# Patient Record
Sex: FEMALE | Race: WHITE | Employment: FULL TIME | URBAN - METROPOLITAN AREA
[De-identification: names, ages, dates, MRNs, and addresses within clinical notes are randomized per-mention and may not be internally consistent; named-entity substitution may affect disease eponyms.]

---

## 2017-08-07 ENCOUNTER — OFFICE VISIT (OUTPATIENT)
Dept: OBGYN | Age: 52
End: 2017-08-07

## 2017-08-07 VITALS
WEIGHT: 169 LBS | BODY MASS INDEX: 28.85 KG/M2 | SYSTOLIC BLOOD PRESSURE: 128 MMHG | DIASTOLIC BLOOD PRESSURE: 80 MMHG | HEIGHT: 64 IN

## 2017-08-07 DIAGNOSIS — Z12.31 SCREENING MAMMOGRAM, ENCOUNTER FOR: ICD-10-CM

## 2017-08-07 DIAGNOSIS — Z11.51 SPECIAL SCREENING EXAMINATION FOR HUMAN PAPILLOMAVIRUS (HPV): ICD-10-CM

## 2017-08-07 DIAGNOSIS — Z01.419 WOMEN'S ANNUAL ROUTINE GYNECOLOGICAL EXAMINATION: Primary | ICD-10-CM

## 2017-08-07 LAB — PAP SMEAR: NORMAL

## 2017-08-07 PROCEDURE — 99396 PREV VISIT EST AGE 40-64: CPT | Performed by: OBSTETRICS & GYNECOLOGY

## 2017-08-07 RX ORDER — TORSEMIDE 20 MG/1
20 TABLET ORAL DAILY
COMMUNITY

## 2017-08-07 RX ORDER — POTASSIUM CHLORIDE 750 MG/1
10 TABLET, FILM COATED, EXTENDED RELEASE ORAL DAILY
COMMUNITY

## 2017-08-16 DIAGNOSIS — Z11.51 SPECIAL SCREENING EXAMINATION FOR HUMAN PAPILLOMAVIRUS (HPV): ICD-10-CM

## 2017-08-16 DIAGNOSIS — Z01.419 WOMEN'S ANNUAL ROUTINE GYNECOLOGICAL EXAMINATION: ICD-10-CM

## 2017-10-23 DIAGNOSIS — R92.8 ABNORMAL MAMMOGRAM OF LEFT BREAST: Primary | ICD-10-CM

## 2018-08-13 ENCOUNTER — OFFICE VISIT (OUTPATIENT)
Dept: OBGYN CLINIC | Age: 53
End: 2018-08-13
Payer: COMMERCIAL

## 2018-08-13 VITALS
BODY MASS INDEX: 42.85 KG/M2 | SYSTOLIC BLOOD PRESSURE: 126 MMHG | HEIGHT: 64 IN | WEIGHT: 251 LBS | DIASTOLIC BLOOD PRESSURE: 82 MMHG

## 2018-08-13 DIAGNOSIS — Z01.419 WOMEN'S ANNUAL ROUTINE GYNECOLOGICAL EXAMINATION: Primary | ICD-10-CM

## 2018-08-13 DIAGNOSIS — Z11.51 SPECIAL SCREENING EXAMINATION FOR HUMAN PAPILLOMAVIRUS (HPV): ICD-10-CM

## 2018-08-13 DIAGNOSIS — Z12.31 SCREENING MAMMOGRAM, ENCOUNTER FOR: ICD-10-CM

## 2018-08-13 PROCEDURE — 99396 PREV VISIT EST AGE 40-64: CPT | Performed by: OBSTETRICS & GYNECOLOGY

## 2018-08-13 RX ORDER — ASCORBIC ACID 500 MG
500 TABLET ORAL DAILY
COMMUNITY
End: 2021-03-11

## 2018-08-13 RX ORDER — LOSARTAN POTASSIUM 50 MG/1
50 TABLET ORAL DAILY
COMMUNITY
End: 2020-10-13

## 2018-08-13 RX ORDER — OMEPRAZOLE 40 MG/1
40 CAPSULE, DELAYED RELEASE ORAL DAILY
COMMUNITY
End: 2021-03-11

## 2018-08-13 ASSESSMENT — ENCOUNTER SYMPTOMS
ALLERGIC/IMMUNOLOGIC NEGATIVE: 1
CONSTIPATION: 0
BLOOD IN STOOL: 0
RESPIRATORY NEGATIVE: 1
NAUSEA: 0
EYES NEGATIVE: 1
ABDOMINAL PAIN: 0
ABDOMINAL DISTENTION: 0
DIARRHEA: 0
ANAL BLEEDING: 0
VOMITING: 0
RECTAL PAIN: 0

## 2018-08-13 NOTE — PROGRESS NOTES
Subjective:      Patient ID: Elmira Sacks is a 45 y.o. female    Annual exam.  No PMB. No GYN complaints. Pap performed and screening mammogram ordered. Monthly SBE encouraged. Screening colonoscopy recommended per routine. F/U annual exam or prn. Vitals:  /82   Ht 5' 4\" (1.626 m)   Wt 251 lb (113.9 kg)   BMI 43.08 kg/m²   Past Medical History:   Diagnosis Date    Diverticulitis     Hypertension      Past Surgical History:   Procedure Laterality Date    CYST REMOVAL      outter left breast    SIGMOIDECTOMY       Allergies:  Patient has no known allergies. Current Outpatient Prescriptions   Medication Sig Dispense Refill    omeprazole (PRILOSEC) 40 MG delayed release capsule Take 40 mg by mouth daily      vitamin C (ASCORBIC ACID) 500 MG tablet Take 500 mg by mouth daily      FENOFIBRATE PO Take by mouth      losartan (COZAAR) 50 MG tablet Take 50 mg by mouth daily      TURMERIC PO Take by mouth      potassium chloride (KLOR-CON 10) 10 MEQ extended release tablet Take 10 mEq by mouth daily      torsemide (DEMADEX) 20 MG tablet Take 20 mg by mouth daily      Multiple Vitamins-Minerals (THERAPEUTIC MULTIVITAMIN-MINERALS) tablet Take 1 tablet by mouth daily      Omega-3 Fatty Acids (FISH OIL) 1000 MG CAPS Take 3,000 mg by mouth 2 times daily      Calcium Carb-Cholecalciferol (CALCIUM 500 +D PO) Take by mouth      Cranberry 300 MG TABS Take by mouth       No current facility-administered medications for this visit. Social History     Social History    Marital status:      Spouse name: N/A    Number of children: N/A    Years of education: N/A     Occupational History    Not on file.      Social History Main Topics    Smoking status: Never Smoker    Smokeless tobacco: Never Used    Alcohol use Yes      Comment: occ    Drug use: No    Sexual activity: Yes     Partners: Male     Other Topics Concern    Not on file     Social History Narrative    No narrative on file Screening     Order Specific Question:   HPV Requested?      Answer:   Yes     Order Specific Question:   High Risk Patient     Answer:   N/A         Follow up:  Return in about 1 year (around 8/13/2019) for Annual.

## 2018-08-22 DIAGNOSIS — Z01.419 WOMEN'S ANNUAL ROUTINE GYNECOLOGICAL EXAMINATION: ICD-10-CM

## 2018-08-22 DIAGNOSIS — Z11.51 SPECIAL SCREENING EXAMINATION FOR HUMAN PAPILLOMAVIRUS (HPV): ICD-10-CM

## 2019-10-10 ENCOUNTER — OFFICE VISIT (OUTPATIENT)
Dept: OBGYN CLINIC | Age: 54
End: 2019-10-10
Payer: COMMERCIAL

## 2019-10-10 VITALS
DIASTOLIC BLOOD PRESSURE: 78 MMHG | HEIGHT: 64 IN | BODY MASS INDEX: 39.44 KG/M2 | WEIGHT: 231 LBS | SYSTOLIC BLOOD PRESSURE: 124 MMHG

## 2019-10-10 DIAGNOSIS — Z12.31 SCREENING MAMMOGRAM, ENCOUNTER FOR: ICD-10-CM

## 2019-10-10 DIAGNOSIS — Z01.419 WOMEN'S ANNUAL ROUTINE GYNECOLOGICAL EXAMINATION: Primary | ICD-10-CM

## 2019-10-10 DIAGNOSIS — Z11.51 SCREENING FOR HUMAN PAPILLOMAVIRUS: ICD-10-CM

## 2019-10-10 PROCEDURE — 99396 PREV VISIT EST AGE 40-64: CPT | Performed by: OBSTETRICS & GYNECOLOGY

## 2019-10-10 RX ORDER — NAPROXEN 500 MG/1
TABLET ORAL
Refills: 3 | COMMUNITY
Start: 2019-09-21 | End: 2020-10-13

## 2019-10-10 ASSESSMENT — ENCOUNTER SYMPTOMS
ANAL BLEEDING: 0
VOMITING: 0
ABDOMINAL PAIN: 0
ALLERGIC/IMMUNOLOGIC NEGATIVE: 1
RECTAL PAIN: 0
CONSTIPATION: 0
BLOOD IN STOOL: 0
NAUSEA: 0
DIARRHEA: 0
RESPIRATORY NEGATIVE: 1
ABDOMINAL DISTENTION: 0
EYES NEGATIVE: 1

## 2019-10-21 DIAGNOSIS — Z01.419 WOMEN'S ANNUAL ROUTINE GYNECOLOGICAL EXAMINATION: ICD-10-CM

## 2019-10-21 DIAGNOSIS — Z11.51 SCREENING FOR HUMAN PAPILLOMAVIRUS: ICD-10-CM

## 2019-11-18 DIAGNOSIS — Z12.31 SCREENING MAMMOGRAM, ENCOUNTER FOR: ICD-10-CM

## 2019-12-17 ENCOUNTER — TELEPHONE (OUTPATIENT)
Dept: ADMINISTRATIVE | Age: 54
End: 2019-12-17

## 2020-10-13 ENCOUNTER — OFFICE VISIT (OUTPATIENT)
Dept: OBGYN CLINIC | Age: 55
End: 2020-10-13
Payer: COMMERCIAL

## 2020-10-13 VITALS
SYSTOLIC BLOOD PRESSURE: 122 MMHG | BODY MASS INDEX: 35.68 KG/M2 | HEIGHT: 64 IN | WEIGHT: 209 LBS | DIASTOLIC BLOOD PRESSURE: 78 MMHG

## 2020-10-13 PROCEDURE — 99396 PREV VISIT EST AGE 40-64: CPT | Performed by: OBSTETRICS & GYNECOLOGY

## 2020-10-13 ASSESSMENT — PATIENT HEALTH QUESTIONNAIRE - PHQ9
2. FEELING DOWN, DEPRESSED OR HOPELESS: 0
SUM OF ALL RESPONSES TO PHQ QUESTIONS 1-9: 0
SUM OF ALL RESPONSES TO PHQ9 QUESTIONS 1 & 2: 0
SUM OF ALL RESPONSES TO PHQ QUESTIONS 1-9: 0
1. LITTLE INTEREST OR PLEASURE IN DOING THINGS: 0

## 2020-10-13 ASSESSMENT — ENCOUNTER SYMPTOMS
NAUSEA: 0
RECTAL PAIN: 0
EYES NEGATIVE: 1
ABDOMINAL DISTENTION: 0
ANAL BLEEDING: 0
CONSTIPATION: 0
BLOOD IN STOOL: 0
ALLERGIC/IMMUNOLOGIC NEGATIVE: 1
VOMITING: 0
RESPIRATORY NEGATIVE: 1
DIARRHEA: 0
ABDOMINAL PAIN: 0

## 2020-10-13 ASSESSMENT — VISUAL ACUITY: OU: 1

## 2020-10-13 NOTE — PROGRESS NOTES
Subjective:      Patient ID: Elissa Ventura is a 60 y.o. female    Annual exam.  No PMB. No GYN complaints. Pap deferred ( negative 2019 ) and screening mammogram ordered. Monthly SBE encouraged. Screening colonoscopy recommended per routine. F/U annual exam or prn. Vitals:  /78   Ht 5' 4\" (1.626 m)   Wt 209 lb (94.8 kg)   BMI 35.87 kg/m²   Past Medical History:   Diagnosis Date    Diverticulitis     Hypertension      Past Surgical History:   Procedure Laterality Date    CYST REMOVAL      outter left breast    SIGMOIDECTOMY       Allergies:  Patient has no known allergies. Current Outpatient Medications   Medication Sig Dispense Refill    omeprazole (PRILOSEC) 40 MG delayed release capsule Take 40 mg by mouth daily      vitamin C (ASCORBIC ACID) 500 MG tablet Take 500 mg by mouth daily      FENOFIBRATE PO Take by mouth      potassium chloride (KLOR-CON 10) 10 MEQ extended release tablet Take 10 mEq by mouth daily      torsemide (DEMADEX) 20 MG tablet Take 20 mg by mouth daily      Multiple Vitamins-Minerals (THERAPEUTIC MULTIVITAMIN-MINERALS) tablet Take 1 tablet by mouth daily      Omega-3 Fatty Acids (FISH OIL) 1000 MG CAPS Take 3,000 mg by mouth 2 times daily      Calcium Carb-Cholecalciferol (CALCIUM 500 +D PO) Take by mouth      Cranberry 300 MG TABS Take by mouth       No current facility-administered medications for this visit.       Social History     Socioeconomic History    Marital status:      Spouse name: Not on file    Number of children: Not on file    Years of education: Not on file    Highest education level: Not on file   Occupational History    Not on file   Social Needs    Financial resource strain: Not on file    Food insecurity     Worry: Not on file     Inability: Not on file    Transportation needs     Medical: Not on file     Non-medical: Not on file   Tobacco Use    Smoking status: Never Smoker    Smokeless tobacco: Never Used   Substance and Sexual Activity    Alcohol use: Yes     Comment: occ    Drug use: No    Sexual activity: Yes     Partners: Male   Lifestyle    Physical activity     Days per week: Not on file     Minutes per session: Not on file    Stress: Not on file   Relationships    Social connections     Talks on phone: Not on file     Gets together: Not on file     Attends [employer] service: Not on file     Active member of club or organization: Not on file     Attends meetings of clubs or organizations: Not on file     Relationship status: Not on file    Intimate partner violence     Fear of current or ex partner: Not on file     Emotionally abused: Not on file     Physically abused: Not on file     Forced sexual activity: Not on file   Other Topics Concern    Not on file   Social History Narrative    Not on file     Family History   Problem Relation Age of Onset    Breast Cancer Neg Hx     Cancer Neg Hx     Colon Cancer Neg Hx     Diabetes Neg Hx     Eclampsia Neg Hx     Hypertension Neg Hx     Ovarian Cancer Neg Hx      Labor Neg Hx     Spont Abortions Neg Hx     Stroke Neg Hx        Review of Systems   Constitutional: Negative. Negative for activity change, appetite change, chills, diaphoresis, fatigue, fever and unexpected weight change. HENT: Negative. Eyes: Negative. Respiratory: Negative. Cardiovascular: Negative. Gastrointestinal: Negative for abdominal distention, abdominal pain, anal bleeding, blood in stool, constipation, diarrhea, nausea, rectal pain and vomiting. Endocrine: Negative. Genitourinary: Negative for decreased urine volume, difficulty urinating, dyspareunia, dysuria, enuresis, flank pain, frequency, genital sores, hematuria, menstrual problem, pelvic pain, urgency, vaginal bleeding, vaginal discharge and vaginal pain. Musculoskeletal: Negative. Skin: Negative. Allergic/Immunologic: Negative. Neurological: Negative. Hematological: Negative. Psychiatric/Behavioral: Negative. Objective:     Physical Exam  Constitutional:       Appearance: She is well-developed. HENT:      Head: Normocephalic. Eyes:      General: Lids are normal. Vision grossly intact. Neck:      Musculoskeletal: Normal range of motion and neck supple. Thyroid: No thyromegaly. Cardiovascular:      Rate and Rhythm: Normal rate and regular rhythm. Heart sounds: Normal heart sounds. Pulmonary:      Effort: Pulmonary effort is normal. No respiratory distress. Breath sounds: Normal breath sounds. No wheezing or rales. Chest:      Chest wall: No tenderness. Breasts:         Right: Normal. No swelling, bleeding, inverted nipple, mass, nipple discharge, skin change or tenderness. Left: Normal. No swelling, bleeding, inverted nipple, mass, nipple discharge, skin change or tenderness. Abdominal:      General: There is no distension. Palpations: Abdomen is soft. There is no mass. Tenderness: There is no abdominal tenderness. There is no guarding or rebound. Hernia: No hernia is present. There is no hernia in the left inguinal area or right inguinal area. Genitourinary:     General: Normal vulva. Pubic Area: No rash. Labia:         Right: No rash, tenderness, lesion or injury. Left: No rash, tenderness, lesion or injury. Urethra: No prolapse, urethral swelling or urethral lesion. Vagina: Normal. No signs of injury and foreign body. No vaginal discharge, erythema, tenderness or bleeding. Cervix: No cervical motion tenderness, discharge or friability. Uterus: Not deviated, not enlarged, not fixed and not tender. Adnexa:         Right: No mass, tenderness or fullness. Left: No mass, tenderness or fullness. Rectum: Normal.   Musculoskeletal: Normal range of motion. General: No tenderness. Lymphadenopathy:      Cervical: No cervical adenopathy.       Upper Body:

## 2020-10-13 NOTE — PROGRESS NOTES
The patient was asked if she would like a chaperone present for her intimate exam. She  Declined the chaperone.  Sly Gerber

## 2021-03-11 ENCOUNTER — OFFICE VISIT (OUTPATIENT)
Dept: OBGYN CLINIC | Age: 56
End: 2021-03-11
Payer: COMMERCIAL

## 2021-03-11 VITALS
HEIGHT: 64 IN | BODY MASS INDEX: 35.51 KG/M2 | DIASTOLIC BLOOD PRESSURE: 72 MMHG | WEIGHT: 208 LBS | SYSTOLIC BLOOD PRESSURE: 136 MMHG

## 2021-03-11 DIAGNOSIS — N89.8 VAGINAL DISCHARGE: Primary | ICD-10-CM

## 2021-03-11 DIAGNOSIS — L29.2 VULVAR ITCHING: ICD-10-CM

## 2021-03-11 LAB
BILIRUBIN, POC: NORMAL
BLOOD URINE, POC: NORMAL
CLARITY, POC: CLEAR
COLOR, POC: YELLOW
GLUCOSE URINE, POC: NORMAL
KETONES, POC: NORMAL
LEUKOCYTE EST, POC: NORMAL
NITRITE, POC: NORMAL
PH, POC: 5
PROTEIN, POC: NORMAL
SPECIFIC GRAVITY, POC: 1.02
UROBILINOGEN, POC: NORMAL

## 2021-03-11 PROCEDURE — 99212 OFFICE O/P EST SF 10 MIN: CPT | Performed by: OBSTETRICS & GYNECOLOGY

## 2021-03-11 PROCEDURE — 81003 URINALYSIS AUTO W/O SCOPE: CPT | Performed by: OBSTETRICS & GYNECOLOGY

## 2021-03-11 NOTE — PROGRESS NOTES
Patient here c/o vaginal irritation and discharge. Patient states she started with some increased white discharge several weeks ago. Treated on own with Monistat 7 and still having sx. Called PCP and received Diflucan x 1. She then had appointment with PCP and treated for presumptive UTI with Cipro x 7 days. No urine dip/cx per patient. States still has some vaginal dx. SSE today negative. Will send cx today and await results for treatment as necessary. Epsom salt sitz baths and no further meds/creams for now. All questions answered. Vitals:  /72   Ht 5' 4\" (1.626 m)   Wt 208 lb (94.3 kg)   BMI 35.70 kg/m²   Past Medical History:   Diagnosis Date    Diverticulitis     Hypertension      Past Surgical History:   Procedure Laterality Date    CYST REMOVAL      outter left breast    SIGMOIDECTOMY       Allergies:  Patient has no known allergies. Current Outpatient Medications   Medication Sig Dispense Refill    FENOFIBRATE PO Take by mouth      potassium chloride (KLOR-CON 10) 10 MEQ extended release tablet Take 10 mEq by mouth daily      torsemide (DEMADEX) 20 MG tablet Take 20 mg by mouth daily      Multiple Vitamins-Minerals (THERAPEUTIC MULTIVITAMIN-MINERALS) tablet Take 1 tablet by mouth daily      Omega-3 Fatty Acids (FISH OIL) 1000 MG CAPS Take 3,000 mg by mouth 2 times daily      Calcium Carb-Cholecalciferol (CALCIUM 500 +D PO) Take by mouth      Cranberry 300 MG TABS Take by mouth       No current facility-administered medications for this visit.       Social History     Socioeconomic History    Marital status:      Spouse name: Not on file    Number of children: Not on file    Years of education: Not on file    Highest education level: Not on file   Occupational History    Not on file   Social Needs    Financial resource strain: Not on file    Food insecurity     Worry: Not on file     Inability: Not on file    Transportation needs     Medical: Not on file Non-medical: Not on file   Tobacco Use    Smoking status: Never Smoker    Smokeless tobacco: Never Used   Substance and Sexual Activity    Alcohol use: Yes     Comment: occ    Drug use: No    Sexual activity: Yes     Partners: Male   Lifestyle    Physical activity     Days per week: Not on file     Minutes per session: Not on file    Stress: Not on file   Relationships    Social connections     Talks on phone: Not on file     Gets together: Not on file     Attends Gnosticism service: Not on file     Active member of club or organization: Not on file     Attends meetings of clubs or organizations: Not on file     Relationship status: Not on file    Intimate partner violence     Fear of current or ex partner: Not on file     Emotionally abused: Not on file     Physically abused: Not on file     Forced sexual activity: Not on file   Other Topics Concern    Not on file   Social History Narrative    Not on file        Family History   Problem Relation Age of Onset    Breast Cancer Neg Hx     Cancer Neg Hx     Colon Cancer Neg Hx     Diabetes Neg Hx     Eclampsia Neg Hx     Hypertension Neg Hx     Ovarian Cancer Neg Hx      Labor Neg Hx     Spont Abortions Neg Hx     Stroke Neg Hx        Review of Systems   Constitutional: Negative. Negative for activity change, appetite change, chills, diaphoresis, fatigue, fever and unexpected weight change. HENT: Negative. Eyes: Negative. Respiratory: Negative. Cardiovascular: Negative. Gastrointestinal: Negative for abdominal distention, abdominal pain, anal bleeding, blood in stool, constipation, diarrhea, nausea, rectal pain and vomiting. Endocrine: Negative. Genitourinary: Positive for vaginal discharge and vaginal pain (Irritation). Negative for decreased urine volume, difficulty urinating, dyspareunia, dysuria, enuresis, flank pain, frequency, genital sores, hematuria, menstrual problem, pelvic pain, urgency and vaginal bleeding. Musculoskeletal: Negative. Skin: Negative. Allergic/Immunologic: Negative. Neurological: Negative. Hematological: Negative. Psychiatric/Behavioral: Negative. Objective:     Physical Exam  Constitutional:       General: She is not in acute distress. Appearance: She is well-developed. She is not diaphoretic. HENT:      Head: Normocephalic and atraumatic. Eyes:      Conjunctiva/sclera: Conjunctivae normal.   Neck:      Musculoskeletal: Normal range of motion and neck supple. Cardiovascular:      Rate and Rhythm: Normal rate and regular rhythm. Pulmonary:      Effort: Pulmonary effort is normal. No respiratory distress. Genitourinary:     Labia:         Right: No rash, tenderness, lesion or injury. Left: No rash, tenderness, lesion or injury. Vagina: Normal. No signs of injury and foreign body. No vaginal discharge, erythema, tenderness or bleeding. Comments: No abnormal discharge. No cervical or vaginal lesions. Normal external genitalia. Musculoskeletal: Normal range of motion. General: No tenderness or deformity. Skin:     General: Skin is warm and dry. Coloration: Skin is not pale. Neurological:      Mental Status: She is alert and oriented to person, place, and time. Motor: No abnormal muscle tone. Coordination: Coordination normal.   Psychiatric:         Behavior: Behavior normal.         Thought Content: Thought content normal.         Judgment: Judgment normal.         Assessment:          Diagnosis Orders   1. Vaginal discharge  Miscellaneous sendout 3   2. Vulvar itching  Miscellaneous sendout 3        Plan:      Medications placedthis encounter:  No orders of the defined types were placed in this encounter. Orders placedthis encounter:  Orders Placed This Encounter   Procedures    Miscellaneous sendout 3     Standing Status:   Future     Standing Expiration Date:   3/11/2022     Order Specific Question:   Specify Req. Test (1 Test/Order)     Answer:   cultures    POCT Urinalysis No Micro (Auto)         Follow up:  Return for Annual.

## 2021-03-12 ASSESSMENT — ENCOUNTER SYMPTOMS
RESPIRATORY NEGATIVE: 1
VOMITING: 0
ALLERGIC/IMMUNOLOGIC NEGATIVE: 1
DIARRHEA: 0
NAUSEA: 0
ABDOMINAL DISTENTION: 0
BLOOD IN STOOL: 0
CONSTIPATION: 0
ANAL BLEEDING: 0
ABDOMINAL PAIN: 0
EYES NEGATIVE: 1
RECTAL PAIN: 0

## 2021-03-17 DIAGNOSIS — N89.8 VAGINAL DISCHARGE: ICD-10-CM

## 2021-03-17 DIAGNOSIS — L29.2 VULVAR ITCHING: ICD-10-CM

## 2021-09-09 ENCOUNTER — OFFICE VISIT (OUTPATIENT)
Dept: OBGYN CLINIC | Age: 56
End: 2021-09-09
Payer: COMMERCIAL

## 2021-09-09 VITALS
BODY MASS INDEX: 35.85 KG/M2 | HEIGHT: 64 IN | SYSTOLIC BLOOD PRESSURE: 126 MMHG | WEIGHT: 210 LBS | DIASTOLIC BLOOD PRESSURE: 82 MMHG

## 2021-09-09 DIAGNOSIS — I86.3 VULVAL VARICES: Primary | ICD-10-CM

## 2021-09-09 DIAGNOSIS — N89.8 VAGINAL CYST: ICD-10-CM

## 2021-09-09 PROCEDURE — 99213 OFFICE O/P EST LOW 20 MIN: CPT | Performed by: OBSTETRICS & GYNECOLOGY

## 2021-09-09 ASSESSMENT — PATIENT HEALTH QUESTIONNAIRE - PHQ9
SUM OF ALL RESPONSES TO PHQ QUESTIONS 1-9: 0
SUM OF ALL RESPONSES TO PHQ9 QUESTIONS 1 & 2: 0
1. LITTLE INTEREST OR PLEASURE IN DOING THINGS: 0
2. FEELING DOWN, DEPRESSED OR HOPELESS: 0

## 2021-09-09 NOTE — PROGRESS NOTES
SUBJECTIVE:   60 y.o.  female complains of vulvar cyst that developed last week. PT states she noticed it when she was using the bathroom and states the cyst got bigger over the next couple of days and then started to spontaneously drain. PT states she can feel a small cyst at this time but feels it is mostly healed up. PT  with no additional complaints. Review of Systems:  General ROS: negative  Psychological ROS: negative  ENT ROS: negative  Endocrine ROS: negative  Respiratory ROS: no cough, shortness of breath, or wheezing  Cardiovascular ROS: no chest pain or dyspnea on exertion  Gastrointestinal ROS: no abdominal pain, change in bowel habits, or black or bloody stools  Genito-Urinary ROS: no dysuria, trouble voiding, or hematuria  Musculoskeletal ROS: negative  Neurological ROS: no TIA or stroke symptoms  Dermatological ROS: negative    OBJECTIVE:   /82   Ht 5' 4\" (1.626 m)   Wt 210 lb (95.3 kg)   BMI 36.05 kg/m²     Physical Exam:  CONSTITUTIONAL: She appears well nourished and developed   NEUROLOGIC: Alert and oriented to time, place and person  NECK: no thyroidmegaly  LUNGS: Clear to ascultation bilaterally  CVS: regular rate and rhythm  LYMPHATIC: No palpable lymph nodes  ABDOMEN: benign, soft, nontender, no masses. No liver or splenic organomegaly. No evidence of abdominal or inguinal hernia.  No indication for occult blood testing  SKIN: normal texture and tone, no lesions  NEURO: normal tone, no hyperreflexia, 1+DTRs throughout    Pelvic Exam:   EFG: normal external genitalia, small varicosity where pt reports cyst was, no active drainage or cyst noted  URETHRAL MEATUS: normal size, no diverticula   URETHRA: normal appearing without diverticula or lesions  BLADDER:  No masses or tenderness  VAGINA: normal rugae, no discharge   PERINEUM: normal appearing without lesions or masses    ASSESSMENT:   Vulvar varicosity     PLAN:   Past medical, social and family history reviewed and updated in pt's chart. Pt counseled on naturally h/o vulvar cysts and vulvar varicosities.    Pt to f/u for annual screening as directed

## 2021-10-13 ENCOUNTER — OFFICE VISIT (OUTPATIENT)
Dept: OBGYN CLINIC | Age: 56
End: 2021-10-13
Payer: COMMERCIAL

## 2021-10-13 VITALS
SYSTOLIC BLOOD PRESSURE: 128 MMHG | BODY MASS INDEX: 36.54 KG/M2 | DIASTOLIC BLOOD PRESSURE: 88 MMHG | HEIGHT: 64 IN | WEIGHT: 214 LBS

## 2021-10-13 DIAGNOSIS — Z12.31 SCREENING MAMMOGRAM, ENCOUNTER FOR: ICD-10-CM

## 2021-10-13 DIAGNOSIS — Z01.419 ENCOUNTER FOR WELL WOMAN EXAM WITH ROUTINE GYNECOLOGICAL EXAM: Primary | ICD-10-CM

## 2021-10-13 PROCEDURE — 99396 PREV VISIT EST AGE 40-64: CPT | Performed by: OBSTETRICS & GYNECOLOGY

## 2021-10-13 ASSESSMENT — ENCOUNTER SYMPTOMS
BLOOD IN STOOL: 0
NAUSEA: 0
ALLERGIC/IMMUNOLOGIC NEGATIVE: 1
DIARRHEA: 0
CONSTIPATION: 0
RECTAL PAIN: 0
EYES NEGATIVE: 1
ABDOMINAL PAIN: 0
ABDOMINAL DISTENTION: 0
ANAL BLEEDING: 0
VOMITING: 0
RESPIRATORY NEGATIVE: 1

## 2021-10-13 ASSESSMENT — VISUAL ACUITY: OU: 1

## 2021-10-13 NOTE — PROGRESS NOTES
Subjective:      Patient ID: Juan Piper is a 60 y.o. female    Annual exam.  No PMB. No GYN complaints. Pap deferred ( negative 2019 )  and screening mammogram ordered. Monthly SBE encouraged. Screening colonoscopy recommended per routine. F/U annual exam or prn. Vitals:  /88   Ht 5' 4\" (1.626 m)   Wt 214 lb (97.1 kg)   BMI 36.73 kg/m²   Past Medical History:   Diagnosis Date    Diverticulitis     Hypertension      Past Surgical History:   Procedure Laterality Date    CYST REMOVAL      outter left breast    SIGMOIDECTOMY       Allergies:  Patient has no known allergies. Current Outpatient Medications   Medication Sig Dispense Refill    FENOFIBRATE PO Take by mouth      potassium chloride (KLOR-CON 10) 10 MEQ extended release tablet Take 10 mEq by mouth daily      torsemide (DEMADEX) 20 MG tablet Take 20 mg by mouth daily      Multiple Vitamins-Minerals (THERAPEUTIC MULTIVITAMIN-MINERALS) tablet Take 1 tablet by mouth daily      Omega-3 Fatty Acids (FISH OIL) 1000 MG CAPS Take 3,000 mg by mouth 2 times daily      Calcium Carb-Cholecalciferol (CALCIUM 500 +D PO) Take by mouth      Cranberry 300 MG TABS Take by mouth       No current facility-administered medications for this visit.      Social History     Socioeconomic History    Marital status:      Spouse name: Not on file    Number of children: Not on file    Years of education: Not on file    Highest education level: Not on file   Occupational History    Not on file   Tobacco Use    Smoking status: Never Smoker    Smokeless tobacco: Never Used   Substance and Sexual Activity    Alcohol use: Yes     Comment: occ    Drug use: No    Sexual activity: Yes     Partners: Male   Other Topics Concern    Not on file   Social History Narrative    Not on file     Social Determinants of Health     Financial Resource Strain:     Difficulty of Paying Living Expenses:    Food Insecurity:     Worried About Running Out of Food in the Last Year:    [address] in the Last Year:    Transportation Needs:     Lack of Transportation (Medical):  Lack of Transportation (Non-Medical):    Physical Activity:     Days of Exercise per Week:     Minutes of Exercise per Session:    Stress:     Feeling of Stress :    Social Connections:     Frequency of Communication with Friends and Family:     Frequency of Social Gatherings with Friends and Family:     Attends Catholic Services:     Active Member of Clubs or Organizations:     Attends Club or Organization Meetings:     Marital Status:    Intimate Partner Violence:     Fear of Current or Ex-Partner:     Emotionally Abused:     Physically Abused:     Sexually Abused:      Family History   Problem Relation Age of Onset    Breast Cancer Neg Hx     Cancer Neg Hx     Colon Cancer Neg Hx     Diabetes Neg Hx     Eclampsia Neg Hx     Hypertension Neg Hx     Ovarian Cancer Neg Hx      Labor Neg Hx     Spont Abortions Neg Hx     Stroke Neg Hx        Review of Systems   Constitutional: Negative. Negative for activity change, appetite change, chills, diaphoresis, fatigue, fever and unexpected weight change. HENT: Negative. Eyes: Negative. Respiratory: Negative. Cardiovascular: Negative. Gastrointestinal: Negative for abdominal distention, abdominal pain, anal bleeding, blood in stool, constipation, diarrhea, nausea, rectal pain and vomiting. Endocrine: Negative. Genitourinary: Negative for decreased urine volume, difficulty urinating, dyspareunia, dysuria, enuresis, flank pain, frequency, genital sores, hematuria, menstrual problem, pelvic pain, urgency, vaginal bleeding, vaginal discharge and vaginal pain. Musculoskeletal: Negative. Skin: Negative. Allergic/Immunologic: Negative. Neurological: Negative. Hematological: Negative. Psychiatric/Behavioral: Negative.         Objective:     Physical Exam  Constitutional:       Appearance: She is well-developed. HENT:      Head: Normocephalic. Eyes:      General: Lids are normal. Vision grossly intact. Neck:      Thyroid: No thyromegaly. Cardiovascular:      Rate and Rhythm: Normal rate and regular rhythm. Heart sounds: Normal heart sounds. Pulmonary:      Effort: Pulmonary effort is normal. No respiratory distress. Breath sounds: Normal breath sounds. No wheezing or rales. Chest:      Chest wall: No tenderness. Breasts:         Right: Normal. No swelling, bleeding, inverted nipple, mass, nipple discharge, skin change or tenderness. Left: Normal. No swelling, bleeding, inverted nipple, mass, nipple discharge, skin change or tenderness. Abdominal:      General: There is no distension. Palpations: Abdomen is soft. There is no mass. Tenderness: There is no abdominal tenderness. There is no guarding or rebound. Hernia: No hernia is present. There is no hernia in the left inguinal area or right inguinal area. Genitourinary:     General: Normal vulva. Pubic Area: No rash. Labia:         Right: No rash, tenderness, lesion or injury. Left: No rash, tenderness, lesion or injury. Urethra: No prolapse, urethral swelling or urethral lesion. Vagina: Normal. No signs of injury and foreign body. No vaginal discharge, erythema, tenderness or bleeding. Cervix: No cervical motion tenderness, discharge or friability. Uterus: Not deviated, not enlarged, not fixed and not tender. Adnexa:         Right: No mass, tenderness or fullness. Left: No mass, tenderness or fullness. Rectum: Normal.   Musculoskeletal:         General: No tenderness. Normal range of motion. Cervical back: Normal range of motion and neck supple. Lymphadenopathy:      Cervical: No cervical adenopathy. Upper Body:      Right upper body: No supraclavicular or axillary adenopathy.       Left upper body: No supraclavicular or axillary adenopathy. Lower Body: No right inguinal adenopathy. No left inguinal adenopathy. Skin:     General: Skin is warm and dry. Coloration: Skin is not pale. Findings: No erythema or rash. Neurological:      Mental Status: She is alert and oriented to person, place, and time. Psychiatric:         Behavior: Behavior normal.         Thought Content: Thought content normal.         Judgment: Judgment normal.         Assessment:       Diagnosis Orders   1. Encounter for well woman exam with routine gynecological exam     2. Screening mammogram, encounter for  MADDIE DIGITAL SCREEN W OR WO CAD BILATERAL        Plan:      Medications placedthis encounter:  No orders of the defined types were placed in this encounter. Orders placedthis encounter:  Orders Placed This Encounter   Procedures    MADDIE DIGITAL SCREEN W OR WO CAD BILATERAL     Standing Status:   Future     Standing Expiration Date:   10/13/2022         Follow up:  Return in about 1 year (around 10/13/2022) for Annual.   Repeat Annual GYN exam every 1 year. Cervical Cytology exam starts age 24. If Negative Cytology;  follow-up screening per current guidelines. Mammograms yearly starting at age 36. Calcium and Vitamin D dosing reviewed ( age appropriate ). Colonoscopy and bone density screening discussed ( age appropriate ). Birth control and STD prevention discussed ( age appropriate ). Gardisil counseling completed for all patients ( age appropriate ). Routine health maintenance ( per PCP and guidelines ). The patient was asked if she would like a chaperone present for her intimate exam. She  Declined the chaperone.  Narinder Fritz MD

## 2022-10-19 ENCOUNTER — OFFICE VISIT (OUTPATIENT)
Dept: OBGYN CLINIC | Age: 57
End: 2022-10-19
Payer: COMMERCIAL

## 2022-10-19 VITALS
HEIGHT: 64 IN | BODY MASS INDEX: 37.9 KG/M2 | DIASTOLIC BLOOD PRESSURE: 76 MMHG | SYSTOLIC BLOOD PRESSURE: 120 MMHG | WEIGHT: 222 LBS

## 2022-10-19 DIAGNOSIS — Z12.31 SCREENING MAMMOGRAM FOR BREAST CANCER: ICD-10-CM

## 2022-10-19 DIAGNOSIS — Z01.419 ENCOUNTER FOR WELL WOMAN EXAM WITH ROUTINE GYNECOLOGICAL EXAM: Primary | ICD-10-CM

## 2022-10-19 PROCEDURE — 99396 PREV VISIT EST AGE 40-64: CPT | Performed by: OBSTETRICS & GYNECOLOGY

## 2022-10-19 RX ORDER — LOSARTAN POTASSIUM 50 MG/1
TABLET ORAL
COMMUNITY
Start: 2022-09-29

## 2022-10-19 RX ORDER — ROSUVASTATIN CALCIUM 10 MG/1
TABLET, COATED ORAL
COMMUNITY
Start: 2022-09-29

## 2022-10-19 ASSESSMENT — ENCOUNTER SYMPTOMS
EYES NEGATIVE: 1
CONSTIPATION: 0
DIARRHEA: 0
ANAL BLEEDING: 0
VOMITING: 0
ABDOMINAL PAIN: 0
BLOOD IN STOOL: 0
ABDOMINAL DISTENTION: 0
RECTAL PAIN: 0
NAUSEA: 0
RESPIRATORY NEGATIVE: 1
ALLERGIC/IMMUNOLOGIC NEGATIVE: 1

## 2022-10-19 ASSESSMENT — PATIENT HEALTH QUESTIONNAIRE - PHQ9
2. FEELING DOWN, DEPRESSED OR HOPELESS: 0
SUM OF ALL RESPONSES TO PHQ QUESTIONS 1-9: 0
SUM OF ALL RESPONSES TO PHQ QUESTIONS 1-9: 0
SUM OF ALL RESPONSES TO PHQ9 QUESTIONS 1 & 2: 0
SUM OF ALL RESPONSES TO PHQ QUESTIONS 1-9: 0
1. LITTLE INTEREST OR PLEASURE IN DOING THINGS: 0
SUM OF ALL RESPONSES TO PHQ QUESTIONS 1-9: 0

## 2022-10-19 ASSESSMENT — VISUAL ACUITY: OU: 1

## 2022-10-19 NOTE — PROGRESS NOTES
Subjective:      Patient ID: Author Wang is a 59 y.o. female    Annual exam. Reviewed medical, surgical, social and family history. Also reviewed current medications and allergies. No PMB. No GYN complaints. Pap deferred ( negative co-testing 2019; next pap 2024 ). Screening mammogram ordered. Monthly SBE encouraged. Screening colonoscopy recommended per routine. F/U annual exam or prn. Vitals: There were no vitals taken for this visit. Past Medical History:   Diagnosis Date    Diverticulitis     Hypertension      Past Surgical History:   Procedure Laterality Date    CYST REMOVAL      outter left breast    SIGMOIDECTOMY       Allergies:  Patient has no known allergies. Current Outpatient Medications   Medication Sig Dispense Refill    FENOFIBRATE PO Take by mouth      potassium chloride (KLOR-CON 10) 10 MEQ extended release tablet Take 10 mEq by mouth daily      torsemide (DEMADEX) 20 MG tablet Take 20 mg by mouth daily      Multiple Vitamins-Minerals (THERAPEUTIC MULTIVITAMIN-MINERALS) tablet Take 1 tablet by mouth daily      Omega-3 Fatty Acids (FISH OIL) 1000 MG CAPS Take 3,000 mg by mouth 2 times daily      Calcium Carb-Cholecalciferol (CALCIUM 500 +D PO) Take by mouth      Cranberry 300 MG TABS Take by mouth       No current facility-administered medications for this visit.      Social History     Socioeconomic History    Marital status:      Spouse name: Not on file    Number of children: Not on file    Years of education: Not on file    Highest education level: Not on file   Occupational History    Not on file   Tobacco Use    Smoking status: Never    Smokeless tobacco: Never   Substance and Sexual Activity    Alcohol use: Yes     Comment: occ    Drug use: No    Sexual activity: Yes     Partners: Male   Other Topics Concern    Not on file   Social History Narrative    Not on file     Social Determinants of Health     Financial Resource Strain: Not on file   Food Insecurity: Not on file Transportation Needs: Not on file   Physical Activity: Not on file   Stress: Not on file   Social Connections: Not on file   Intimate Partner Violence: Not on file   Housing Stability: Not on file     Family History   Problem Relation Age of Onset    Breast Cancer Neg Hx     Cancer Neg Hx     Colon Cancer Neg Hx     Diabetes Neg Hx     Eclampsia Neg Hx     Hypertension Neg Hx     Ovarian Cancer Neg Hx      Labor Neg Hx     Spont Abortions Neg Hx     Stroke Neg Hx        Review of Systems   Constitutional: Negative. Negative for activity change, appetite change, chills, diaphoresis, fatigue, fever and unexpected weight change. HENT: Negative. Eyes: Negative. Respiratory: Negative. Cardiovascular: Negative. Gastrointestinal:  Negative for abdominal distention, abdominal pain, anal bleeding, blood in stool, constipation, diarrhea, nausea, rectal pain and vomiting. Endocrine: Negative. Genitourinary:  Negative for decreased urine volume, difficulty urinating, dyspareunia, dysuria, enuresis, flank pain, frequency, genital sores, hematuria, menstrual problem, pelvic pain, urgency, vaginal bleeding, vaginal discharge and vaginal pain. Musculoskeletal: Negative. Skin: Negative. Allergic/Immunologic: Negative. Neurological: Negative. Hematological: Negative. Psychiatric/Behavioral: Negative. Objective:     Physical Exam  Constitutional:       Appearance: She is well-developed. HENT:      Head: Normocephalic. Eyes:      General: Lids are normal. Vision grossly intact. Neck:      Thyroid: No thyromegaly. Cardiovascular:      Rate and Rhythm: Normal rate and regular rhythm. Heart sounds: Normal heart sounds. Pulmonary:      Effort: Pulmonary effort is normal. No respiratory distress. Breath sounds: Normal breath sounds. No wheezing or rales. Chest:      Chest wall: No tenderness.    Breasts:     Right: Normal. No swelling, bleeding, inverted nipple, mass, nipple discharge, skin change or tenderness. Left: Normal. No swelling, bleeding, inverted nipple, mass, nipple discharge, skin change or tenderness. Abdominal:      General: There is no distension. Palpations: Abdomen is soft. There is no mass. Tenderness: There is no abdominal tenderness. There is no guarding or rebound. Hernia: No hernia is present. There is no hernia in the left inguinal area or right inguinal area. Genitourinary:     General: Normal vulva. Pubic Area: No rash. Labia:         Right: No rash, tenderness, lesion or injury. Left: No rash, tenderness, lesion or injury. Urethra: No prolapse, urethral swelling or urethral lesion. Vagina: Normal. No signs of injury and foreign body. No vaginal discharge, erythema, tenderness or bleeding. Cervix: No cervical motion tenderness, discharge or friability. Uterus: Not deviated, not enlarged, not fixed and not tender. Adnexa:         Right: No mass, tenderness or fullness. Left: No mass, tenderness or fullness. Rectum: Normal.   Musculoskeletal:         General: No tenderness. Normal range of motion. Cervical back: Normal range of motion and neck supple. Lymphadenopathy:      Cervical: No cervical adenopathy. Upper Body:      Right upper body: No supraclavicular or axillary adenopathy. Left upper body: No supraclavicular or axillary adenopathy. Lower Body: No right inguinal adenopathy. No left inguinal adenopathy. Skin:     General: Skin is warm and dry. Coloration: Skin is not pale. Findings: No erythema or rash. Neurological:      Mental Status: She is alert and oriented to person, place, and time. Psychiatric:         Behavior: Behavior normal.         Thought Content: Thought content normal.         Judgment: Judgment normal.       Assessment:       Diagnosis Orders   1.  Encounter for well woman exam with routine gynecological exam        2. Screening mammogram for breast cancer  MADDIE DIGITAL SCREEN W OR WO CAD BILATERAL           Plan:      Medications placedthis encounter:  No orders of the defined types were placed in this encounter. Orders placedthis encounter:  Orders Placed This Encounter   Procedures    MADDIE DIGITAL SCREEN W OR WO CAD BILATERAL     Standing Status:   Future     Standing Expiration Date:   10/19/2023         Follow up:  Return in about 1 year (around 10/19/2023) for Annual.  Repeat Annual GYN exam every 1 year. Cervical Cytology exam starts age 24. If Negative Cytology;  follow-up screening per current guidelines. Mammograms yearly starting at age 36. Calcium and Vitamin D dosing reviewed ( age appropriate ). Colonoscopy and bone density screening discussed ( age appropriate ). Birth control and STD prevention discussed ( age appropriate ). Gardisil counseling completed for all patients ( age appropriate ). Routine health maintenance ( per PCP and guidelines ).

## 2022-10-19 NOTE — PROGRESS NOTES
The patient was asked if she would like a chaperone present for her intimate exam. She  Declined the chaperone.  Nelson Bishop CMA ([address] [place] [address])

## 2022-12-21 DIAGNOSIS — Z12.31 SCREENING MAMMOGRAM FOR BREAST CANCER: ICD-10-CM

## 2023-04-28 LAB
ALANINE AMINOTRANSFERASE (SGPT) (U/L) IN SER/PLAS: 33 U/L (ref 7–45)
ALBUMIN (G/DL) IN SER/PLAS: 4.4 G/DL (ref 3.4–5)
ALBUMIN (MG/L) IN URINE: <7 MG/L
ALBUMIN/CREATININE (UG/MG) IN URINE: NORMAL UG/MG CRT (ref 0–30)
ALKALINE PHOSPHATASE (U/L) IN SER/PLAS: 31 U/L (ref 33–110)
ANION GAP IN SER/PLAS: 14 MMOL/L (ref 10–20)
APPEARANCE, URINE: NORMAL
ASPARTATE AMINOTRANSFERASE (SGOT) (U/L) IN SER/PLAS: 21 U/L (ref 9–39)
BASOPHILS (10*3/UL) IN BLOOD BY AUTOMATED COUNT: 0.07 X10E9/L (ref 0–0.1)
BASOPHILS/100 LEUKOCYTES IN BLOOD BY AUTOMATED COUNT: 1 % (ref 0–2)
BILIRUBIN TOTAL (MG/DL) IN SER/PLAS: 0.5 MG/DL (ref 0–1.2)
BILIRUBIN, URINE: NEGATIVE
BLOOD, URINE: NEGATIVE
CALCIDIOL (25 OH VITAMIN D3) (NG/ML) IN SER/PLAS: 38 NG/ML
CALCIUM (MG/DL) IN SER/PLAS: 10.1 MG/DL (ref 8.6–10.3)
CARBON DIOXIDE, TOTAL (MMOL/L) IN SER/PLAS: 23 MMOL/L (ref 21–32)
CHLORIDE (MMOL/L) IN SER/PLAS: 110 MMOL/L (ref 98–107)
CHOLESTEROL (MG/DL) IN SER/PLAS: 147 MG/DL (ref 0–199)
CHOLESTEROL IN HDL (MG/DL) IN SER/PLAS: 47.8 MG/DL
CHOLESTEROL/HDL RATIO: 3.1
COBALAMIN (VITAMIN B12) (PG/ML) IN SER/PLAS: 515 PG/ML (ref 211–911)
COLOR, URINE: YELLOW
CREATININE (MG/DL) IN SER/PLAS: 0.93 MG/DL (ref 0.5–1.05)
CREATININE (MG/DL) IN URINE: 104 MG/DL (ref 20–320)
CREATININE (MG/DL) IN URINE: 104 MG/DL (ref 20–320)
EOSINOPHILS (10*3/UL) IN BLOOD BY AUTOMATED COUNT: 0.29 X10E9/L (ref 0–0.7)
EOSINOPHILS/100 LEUKOCYTES IN BLOOD BY AUTOMATED COUNT: 4.2 % (ref 0–6)
ERYTHROCYTE DISTRIBUTION WIDTH (RATIO) BY AUTOMATED COUNT: 12.8 % (ref 11.5–14.5)
ERYTHROCYTE MEAN CORPUSCULAR HEMOGLOBIN CONCENTRATION (G/DL) BY AUTOMATED: 32.6 G/DL (ref 32–36)
ERYTHROCYTE MEAN CORPUSCULAR VOLUME (FL) BY AUTOMATED COUNT: 100 FL (ref 80–100)
ERYTHROCYTES (10*6/UL) IN BLOOD BY AUTOMATED COUNT: 4.43 X10E12/L (ref 4–5.2)
ESTIMATED AVERAGE GLUCOSE FOR HBA1C: 105 MG/DL
GFR FEMALE: 71 ML/MIN/1.73M2
GLUCOSE (MG/DL) IN SER/PLAS: 83 MG/DL (ref 74–99)
GLUCOSE, URINE: NEGATIVE MG/DL
HEMATOCRIT (%) IN BLOOD BY AUTOMATED COUNT: 44.2 % (ref 36–46)
HEMOGLOBIN (G/DL) IN BLOOD: 14.4 G/DL (ref 12–16)
HEMOGLOBIN A1C/HEMOGLOBIN TOTAL IN BLOOD: 5.3 %
IMMATURE GRANULOCYTES/100 LEUKOCYTES IN BLOOD BY AUTOMATED COUNT: 0.1 % (ref 0–0.9)
INSULIN: 22 UIU/ML (ref 3–25)
KETONES, URINE: NEGATIVE MG/DL
LDL: 75 MG/DL (ref 0–99)
LEUKOCYTE ESTERASE, URINE: NEGATIVE
LEUKOCYTES (10*3/UL) IN BLOOD BY AUTOMATED COUNT: 6.9 X10E9/L (ref 4.4–11.3)
LYMPHOCYTES (10*3/UL) IN BLOOD BY AUTOMATED COUNT: 2.3 X10E9/L (ref 1.2–4.8)
LYMPHOCYTES/100 LEUKOCYTES IN BLOOD BY AUTOMATED COUNT: 33.3 % (ref 13–44)
MONOCYTES (10*3/UL) IN BLOOD BY AUTOMATED COUNT: 0.51 X10E9/L (ref 0.1–1)
MONOCYTES/100 LEUKOCYTES IN BLOOD BY AUTOMATED COUNT: 7.4 % (ref 2–10)
NEUTROPHILS (10*3/UL) IN BLOOD BY AUTOMATED COUNT: 3.72 X10E9/L (ref 1.2–7.7)
NEUTROPHILS/100 LEUKOCYTES IN BLOOD BY AUTOMATED COUNT: 54 % (ref 40–80)
NITRITE, URINE: NEGATIVE
PH, URINE: 6 (ref 5–8)
PLATELETS (10*3/UL) IN BLOOD AUTOMATED COUNT: 268 X10E9/L (ref 150–450)
POTASSIUM (MMOL/L) IN SER/PLAS: 3.8 MMOL/L (ref 3.5–5.3)
PROTEIN (MG/DL) IN URINE: 6 MG/DL (ref 5–24)
PROTEIN TOTAL: 6.6 G/DL (ref 6.4–8.2)
PROTEIN, URINE: NEGATIVE MG/DL
PROTEIN/CREATININE (MG/MG) IN URINE: 0.06 MG/MG CREAT (ref 0–0.17)
SODIUM (MMOL/L) IN SER/PLAS: 143 MMOL/L (ref 136–145)
SPECIFIC GRAVITY, URINE: 1.02 (ref 1–1.03)
THYROTROPIN (MIU/L) IN SER/PLAS BY DETECTION LIMIT <= 0.05 MIU/L: 1.8 MIU/L (ref 0.44–3.98)
TRIGLYCERIDE (MG/DL) IN SER/PLAS: 122 MG/DL (ref 0–149)
UREA NITROGEN (MG/DL) IN SER/PLAS: 26 MG/DL (ref 6–23)
UROBILINOGEN, URINE: <2 MG/DL (ref 0–1.9)
VLDL: 24 MG/DL (ref 0–40)

## 2023-10-10 ENCOUNTER — LAB (OUTPATIENT)
Dept: LAB | Facility: LAB | Age: 58
End: 2023-10-10
Payer: COMMERCIAL

## 2023-10-10 ENCOUNTER — HOSPITAL ENCOUNTER (OUTPATIENT)
Dept: RADIOLOGY | Facility: HOSPITAL | Age: 58
Discharge: HOME | End: 2023-10-10
Payer: COMMERCIAL

## 2023-10-10 ENCOUNTER — APPOINTMENT (OUTPATIENT)
Dept: RADIOLOGY | Facility: HOSPITAL | Age: 58
End: 2023-10-10
Payer: COMMERCIAL

## 2023-10-10 DIAGNOSIS — I10 ESSENTIAL (PRIMARY) HYPERTENSION: ICD-10-CM

## 2023-10-10 DIAGNOSIS — R73.9 HYPERGLYCEMIA, UNSPECIFIED: ICD-10-CM

## 2023-10-10 DIAGNOSIS — E78.5 HYPERLIPIDEMIA, UNSPECIFIED: ICD-10-CM

## 2023-10-10 DIAGNOSIS — K21.9 GASTRO-ESOPHAGEAL REFLUX DISEASE WITHOUT ESOPHAGITIS: ICD-10-CM

## 2023-10-10 DIAGNOSIS — E55.9 VITAMIN D DEFICIENCY, UNSPECIFIED: ICD-10-CM

## 2023-10-10 DIAGNOSIS — D51.9 VITAMIN B12 DEFICIENCY ANEMIA, UNSPECIFIED: ICD-10-CM

## 2023-10-10 DIAGNOSIS — I10 ESSENTIAL (PRIMARY) HYPERTENSION: Primary | ICD-10-CM

## 2023-10-10 DIAGNOSIS — R13.10 DYSPHAGIA: ICD-10-CM

## 2023-10-10 LAB
25(OH)D3 SERPL-MCNC: 40 NG/ML (ref 30–100)
ALBUMIN SERPL BCP-MCNC: 4.1 G/DL (ref 3.4–5)
ALP SERPL-CCNC: 46 U/L (ref 33–136)
ALT SERPL W P-5'-P-CCNC: 31 U/L (ref 7–45)
ANION GAP SERPL CALC-SCNC: 13 MMOL/L (ref 10–20)
APPEARANCE UR: ABNORMAL
AST SERPL W P-5'-P-CCNC: 21 U/L (ref 9–39)
BASOPHILS # BLD AUTO: 0.06 X10*3/UL (ref 0–0.1)
BASOPHILS NFR BLD AUTO: 0.9 %
BILIRUB SERPL-MCNC: 0.7 MG/DL (ref 0–1.2)
BILIRUB UR STRIP.AUTO-MCNC: NEGATIVE MG/DL
BUN SERPL-MCNC: 22 MG/DL (ref 6–23)
CALCIUM SERPL-MCNC: 9.9 MG/DL (ref 8.6–10.3)
CHLORIDE SERPL-SCNC: 107 MMOL/L (ref 98–107)
CHOLEST SERPL-MCNC: 138 MG/DL (ref 0–199)
CHOLESTEROL/HDL RATIO: 3.1
CO2 SERPL-SCNC: 26 MMOL/L (ref 21–32)
COLOR UR: YELLOW
CREAT SERPL-MCNC: 0.78 MG/DL (ref 0.5–1.05)
CREAT UR-MCNC: 157.6 MG/DL (ref 20–320)
CREAT UR-MCNC: 157.6 MG/DL (ref 20–320)
EOSINOPHIL # BLD AUTO: 0.25 X10*3/UL (ref 0–0.7)
EOSINOPHIL NFR BLD AUTO: 3.7 %
ERYTHROCYTE [DISTWIDTH] IN BLOOD BY AUTOMATED COUNT: 12.1 % (ref 11.5–14.5)
EST. AVERAGE GLUCOSE BLD GHB EST-MCNC: 105 MG/DL
GFR SERPL CREATININE-BSD FRML MDRD: 87 ML/MIN/1.73M*2
GLUCOSE SERPL-MCNC: 85 MG/DL (ref 74–99)
GLUCOSE UR STRIP.AUTO-MCNC: NEGATIVE MG/DL
HBA1C MFR BLD: 5.3 %
HCT VFR BLD AUTO: 45.5 % (ref 36–46)
HDLC SERPL-MCNC: 43.9 MG/DL
HGB BLD-MCNC: 14.8 G/DL (ref 12–16)
IMM GRANULOCYTES # BLD AUTO: 0.02 X10*3/UL (ref 0–0.7)
IMM GRANULOCYTES NFR BLD AUTO: 0.3 % (ref 0–0.9)
INSULIN P FAST SERPL-ACNC: 25 UIU/ML (ref 3–25)
KETONES UR STRIP.AUTO-MCNC: NEGATIVE MG/DL
LDLC SERPL CALC-MCNC: 61 MG/DL (ref 140–190)
LEUKOCYTE ESTERASE UR QL STRIP.AUTO: NEGATIVE
LYMPHOCYTES # BLD AUTO: 2.16 X10*3/UL (ref 1.2–4.8)
LYMPHOCYTES NFR BLD AUTO: 31.8 %
MAGNESIUM SERPL-MCNC: 2 MG/DL (ref 1.6–2.4)
MCH RBC QN AUTO: 31.4 PG (ref 26–34)
MCHC RBC AUTO-ENTMCNC: 32.5 G/DL (ref 32–36)
MCV RBC AUTO: 96 FL (ref 80–100)
MICROALBUMIN UR-MCNC: <7 MG/L
MICROALBUMIN/CREAT UR: NORMAL MG/G{CREAT}
MONOCYTES # BLD AUTO: 0.52 X10*3/UL (ref 0.1–1)
MONOCYTES NFR BLD AUTO: 7.7 %
NEUTROPHILS # BLD AUTO: 3.78 X10*3/UL (ref 1.2–7.7)
NEUTROPHILS NFR BLD AUTO: 55.6 %
NITRITE UR QL STRIP.AUTO: NEGATIVE
NON HDL CHOLESTEROL: 94 MG/DL (ref 0–149)
NRBC BLD-RTO: 0 /100 WBCS (ref 0–0)
PH UR STRIP.AUTO: 6 [PH]
PLATELET # BLD AUTO: 250 X10*3/UL (ref 150–450)
PMV BLD AUTO: 10.6 FL (ref 7.5–11.5)
POTASSIUM SERPL-SCNC: 4 MMOL/L (ref 3.5–5.3)
PROT SERPL-MCNC: 6.5 G/DL (ref 6.4–8.2)
PROT UR STRIP.AUTO-MCNC: NEGATIVE MG/DL
PROT UR-ACNC: 8 MG/DL (ref 5–24)
PROT/CREAT UR: 0.05 MG/MG CREAT (ref 0–0.17)
RBC # BLD AUTO: 4.72 X10*6/UL (ref 4–5.2)
RBC # UR STRIP.AUTO: NEGATIVE /UL
SODIUM SERPL-SCNC: 142 MMOL/L (ref 136–145)
SP GR UR STRIP.AUTO: 1.02
TRIGL SERPL-MCNC: 165 MG/DL (ref 0–149)
TSH SERPL-ACNC: 2.23 MIU/L (ref 0.44–3.98)
UROBILINOGEN UR STRIP.AUTO-MCNC: <2 MG/DL
VIT B12 SERPL-MCNC: 448 PG/ML (ref 211–911)
VLDL: 33 MG/DL (ref 0–40)
WBC # BLD AUTO: 6.8 X10*3/UL (ref 4.4–11.3)

## 2023-10-10 PROCEDURE — 80053 COMPREHEN METABOLIC PANEL: CPT

## 2023-10-10 PROCEDURE — 82306 VITAMIN D 25 HYDROXY: CPT

## 2023-10-10 PROCEDURE — 36415 COLL VENOUS BLD VENIPUNCTURE: CPT

## 2023-10-10 PROCEDURE — 78264 GASTRIC EMPTYING IMG STUDY: CPT | Performed by: STUDENT IN AN ORGANIZED HEALTH CARE EDUCATION/TRAINING PROGRAM

## 2023-10-10 PROCEDURE — 82607 VITAMIN B-12: CPT

## 2023-10-10 PROCEDURE — 83525 ASSAY OF INSULIN: CPT

## 2023-10-10 PROCEDURE — 85025 COMPLETE CBC W/AUTO DIFF WBC: CPT

## 2023-10-10 PROCEDURE — 84156 ASSAY OF PROTEIN URINE: CPT

## 2023-10-10 PROCEDURE — 83735 ASSAY OF MAGNESIUM: CPT

## 2023-10-10 PROCEDURE — 3430000001 HC RX 343 DIAGNOSTIC RADIOPHARMACEUTICALS: Performed by: INTERNAL MEDICINE

## 2023-10-10 PROCEDURE — 81003 URINALYSIS AUTO W/O SCOPE: CPT

## 2023-10-10 PROCEDURE — 78264 GASTRIC EMPTYING IMG STUDY: CPT

## 2023-10-10 PROCEDURE — 83036 HEMOGLOBIN GLYCOSYLATED A1C: CPT

## 2023-10-10 PROCEDURE — 82570 ASSAY OF URINE CREATININE: CPT

## 2023-10-10 PROCEDURE — A9541 TC99M SULFUR COLLOID: HCPCS | Performed by: INTERNAL MEDICINE

## 2023-10-10 PROCEDURE — 80061 LIPID PANEL: CPT

## 2023-10-10 PROCEDURE — 84443 ASSAY THYROID STIM HORMONE: CPT

## 2023-10-10 PROCEDURE — 82043 UR ALBUMIN QUANTITATIVE: CPT

## 2023-10-10 RX ADMIN — JONES TAKE HOME HYDROCODONE-APAP 5/325MG (8 TABS) 1 MILLICURIE: TAB at 08:12

## 2023-10-24 ENCOUNTER — OFFICE VISIT (OUTPATIENT)
Dept: OBGYN CLINIC | Age: 58
End: 2023-10-24
Payer: COMMERCIAL

## 2023-10-24 VITALS
HEIGHT: 64 IN | DIASTOLIC BLOOD PRESSURE: 72 MMHG | SYSTOLIC BLOOD PRESSURE: 122 MMHG | WEIGHT: 224 LBS | BODY MASS INDEX: 38.24 KG/M2

## 2023-10-24 DIAGNOSIS — Z12.31 SCREENING MAMMOGRAM FOR BREAST CANCER: ICD-10-CM

## 2023-10-24 DIAGNOSIS — Z13.820 OSTEOPOROSIS SCREENING: ICD-10-CM

## 2023-10-24 DIAGNOSIS — Z01.419 ENCOUNTER FOR WELL WOMAN EXAM WITH ROUTINE GYNECOLOGICAL EXAM: Primary | ICD-10-CM

## 2023-10-24 DIAGNOSIS — Z78.0 POSTMENOPAUSAL: ICD-10-CM

## 2023-10-24 PROCEDURE — 99396 PREV VISIT EST AGE 40-64: CPT | Performed by: OBSTETRICS & GYNECOLOGY

## 2023-10-24 RX ORDER — OMEPRAZOLE 40 MG/1
CAPSULE, DELAYED RELEASE ORAL
COMMUNITY
Start: 2023-10-18

## 2023-10-24 SDOH — ECONOMIC STABILITY: HOUSING INSECURITY
IN THE LAST 12 MONTHS, WAS THERE A TIME WHEN YOU DID NOT HAVE A STEADY PLACE TO SLEEP OR SLEPT IN A SHELTER (INCLUDING NOW)?: NO

## 2023-10-24 SDOH — ECONOMIC STABILITY: INCOME INSECURITY: HOW HARD IS IT FOR YOU TO PAY FOR THE VERY BASICS LIKE FOOD, HOUSING, MEDICAL CARE, AND HEATING?: NOT HARD AT ALL

## 2023-10-24 SDOH — ECONOMIC STABILITY: FOOD INSECURITY: WITHIN THE PAST 12 MONTHS, YOU WORRIED THAT YOUR FOOD WOULD RUN OUT BEFORE YOU GOT MONEY TO BUY MORE.: NEVER TRUE

## 2023-10-24 SDOH — ECONOMIC STABILITY: FOOD INSECURITY: WITHIN THE PAST 12 MONTHS, THE FOOD YOU BOUGHT JUST DIDN'T LAST AND YOU DIDN'T HAVE MONEY TO GET MORE.: NEVER TRUE

## 2023-10-24 ASSESSMENT — ENCOUNTER SYMPTOMS
ABDOMINAL PAIN: 0
DIARRHEA: 0
ALLERGIC/IMMUNOLOGIC NEGATIVE: 1
VOMITING: 0
EYES NEGATIVE: 1
RECTAL PAIN: 0
BLOOD IN STOOL: 0
ABDOMINAL DISTENTION: 0
CONSTIPATION: 0
NAUSEA: 0
RESPIRATORY NEGATIVE: 1
ANAL BLEEDING: 0

## 2023-10-24 ASSESSMENT — PATIENT HEALTH QUESTIONNAIRE - PHQ9
SUM OF ALL RESPONSES TO PHQ QUESTIONS 1-9: 0
SUM OF ALL RESPONSES TO PHQ QUESTIONS 1-9: 0
SUM OF ALL RESPONSES TO PHQ9 QUESTIONS 1 & 2: 0
SUM OF ALL RESPONSES TO PHQ QUESTIONS 1-9: 0
2. FEELING DOWN, DEPRESSED OR HOPELESS: 0
SUM OF ALL RESPONSES TO PHQ QUESTIONS 1-9: 0
1. LITTLE INTEREST OR PLEASURE IN DOING THINGS: 0

## 2023-10-24 ASSESSMENT — VISUAL ACUITY: OU: 1

## 2023-10-24 NOTE — PROGRESS NOTES
The patient was asked if she would like a chaperone present for her intimate exam. She  Declined the chaperone.  Eliazar Bustos CMA ([address] [facility])
Psychiatric:         Behavior: Behavior normal.         Thought Content: Thought content normal.         Judgment: Judgment normal.         Assessment:       Diagnosis Orders   1. Encounter for well woman exam with routine gynecological exam        2. Screening mammogram for breast cancer  MADDIE YOLI DIGITAL SCREEN BILATERAL      3. Osteoporosis screening  DEXA BONE DENSITY AXIAL SKELETON      4. Postmenopausal  DEXA BONE DENSITY AXIAL SKELETON           Plan:      Medications placedthis encounter:  No orders of the defined types were placed in this encounter. Orders placedthis encounter:  Orders Placed This Encounter   Procedures    MADDIE YOLI DIGITAL SCREEN BILATERAL     Standing Status:   Future     Standing Expiration Date:   12/24/2024    DEXA BONE DENSITY AXIAL SKELETON     Standing Status:   Future     Standing Expiration Date:   10/24/2024         Follow up:  Return in about 1 year (around 10/24/2024) for Annual.  Repeat Annual GYN exam every 1 year. Cervical Cytology exam starts age 24. If Negative Cytology;  follow-up screening per current guidelines. Mammograms yearly starting at age 36. Calcium and Vitamin D dosing reviewed ( age appropriate ). Colonoscopy and bone density screening discussed ( age appropriate ). Birth control and STD prevention discussed ( age appropriate ). Gardisil counseling completed for all patients ( age appropriate ). Routine health maintenance ( per PCP and guidelines ).

## 2023-12-19 ENCOUNTER — HOSPITAL ENCOUNTER (OUTPATIENT)
Dept: RADIOLOGY | Facility: HOSPITAL | Age: 58
Discharge: HOME | End: 2023-12-19
Payer: COMMERCIAL

## 2023-12-19 DIAGNOSIS — Z12.31 SCREENING MAMMOGRAM FOR BREAST CANCER: ICD-10-CM

## 2023-12-19 PROCEDURE — 77067 SCR MAMMO BI INCL CAD: CPT

## 2023-12-19 PROCEDURE — 77063 BREAST TOMOSYNTHESIS BI: CPT | Performed by: RADIOLOGY

## 2023-12-19 PROCEDURE — 77067 SCR MAMMO BI INCL CAD: CPT | Performed by: RADIOLOGY

## 2023-12-28 ENCOUNTER — HOSPITAL ENCOUNTER (OUTPATIENT)
Dept: RADIOLOGY | Facility: HOSPITAL | Age: 58
Discharge: HOME | End: 2023-12-28
Payer: COMMERCIAL

## 2023-12-28 DIAGNOSIS — R92.8 OTHER ABNORMAL AND INCONCLUSIVE FINDINGS ON DIAGNOSTIC IMAGING OF BREAST: ICD-10-CM

## 2023-12-28 PROCEDURE — 76642 ULTRASOUND BREAST LIMITED: CPT | Mod: LT

## 2023-12-28 PROCEDURE — 77061 BREAST TOMOSYNTHESIS UNI: CPT | Mod: LT

## 2023-12-28 PROCEDURE — 77065 DX MAMMO INCL CAD UNI: CPT | Mod: LT

## 2023-12-29 ENCOUNTER — HOSPITAL ENCOUNTER (OUTPATIENT)
Dept: RADIOLOGY | Facility: HOSPITAL | Age: 58
Discharge: HOME | End: 2023-12-29
Payer: COMMERCIAL

## 2023-12-29 DIAGNOSIS — M81.0 OSTEOPOROSIS, POST-MENOPAUSAL: ICD-10-CM

## 2023-12-29 DIAGNOSIS — R92.8 ABNORMAL MAMMOGRAM OF LEFT BREAST: ICD-10-CM

## 2023-12-29 PROCEDURE — 77080 DXA BONE DENSITY AXIAL: CPT

## 2023-12-29 PROCEDURE — 77080 DXA BONE DENSITY AXIAL: CPT | Performed by: RADIOLOGY

## 2024-01-02 DIAGNOSIS — Z13.820 OSTEOPOROSIS SCREENING: ICD-10-CM

## 2024-01-02 DIAGNOSIS — Z78.0 POSTMENOPAUSAL: ICD-10-CM

## 2024-01-03 DIAGNOSIS — R92.8 ABNORMAL MAMMOGRAM OF LEFT BREAST: Primary | ICD-10-CM

## 2024-01-15 ENCOUNTER — OFFICE VISIT (OUTPATIENT)
Dept: OBGYN CLINIC | Age: 59
End: 2024-01-15
Payer: COMMERCIAL

## 2024-01-15 VITALS
HEIGHT: 64 IN | WEIGHT: 226 LBS | BODY MASS INDEX: 38.58 KG/M2 | SYSTOLIC BLOOD PRESSURE: 136 MMHG | DIASTOLIC BLOOD PRESSURE: 84 MMHG

## 2024-01-15 DIAGNOSIS — M85.80 OSTEOPENIA, UNSPECIFIED LOCATION: Primary | ICD-10-CM

## 2024-01-15 PROCEDURE — 99212 OFFICE O/P EST SF 10 MIN: CPT | Performed by: OBSTETRICS & GYNECOLOGY

## 2024-01-15 ASSESSMENT — ENCOUNTER SYMPTOMS
ANAL BLEEDING: 0
EYES NEGATIVE: 1
ABDOMINAL PAIN: 0
NAUSEA: 0
ALLERGIC/IMMUNOLOGIC NEGATIVE: 1
ABDOMINAL DISTENTION: 0
VOMITING: 0
DIARRHEA: 0
RESPIRATORY NEGATIVE: 1
CONSTIPATION: 0
BLOOD IN STOOL: 0
RECTAL PAIN: 0

## 2024-01-15 NOTE — PROGRESS NOTES
Subjective:      Patient ID: Rosangela Muñiz is a 58 y.o. female    Pt here to discuss Dexa results.  T score = -1.1.   Discussed osteopenia and risks of spontaneous fracture from osteopenia/osteoprorosis.  Discussed prevention measures including Vitamin D and Calcium supplements, weight bearing exercise and meds with risks and benefits.    F/U annual exam or prn.    Vitals:  /84   Ht 1.626 m (5' 4\")   Wt 102.5 kg (226 lb)   BMI 38.79 kg/m²   Past Medical History:   Diagnosis Date    Diverticulitis     Hyperlipidemia     Hypertension      Past Surgical History:   Procedure Laterality Date    CYST REMOVAL      outter left breast    SIGMOIDECTOMY       Allergies:  Patient has no known allergies.  Current Outpatient Medications   Medication Sig Dispense Refill    omeprazole (PRILOSEC) 40 MG delayed release capsule take 1 capsule once daily; 30 mins prior to breakfast      rosuvastatin (CRESTOR) 10 MG tablet TAKE 1 TABLET BY MOUTH ONCE DAILY      FENOFIBRATE PO Take by mouth      potassium chloride (KLOR-CON) 10 MEQ extended release tablet Take 1 tablet by mouth daily      torsemide (DEMADEX) 20 MG tablet Take 1 tablet by mouth daily      Multiple Vitamins-Minerals (THERAPEUTIC MULTIVITAMIN-MINERALS) tablet Take 1 tablet by mouth daily      Omega-3 Fatty Acids (FISH OIL) 1000 MG CAPS Take 3 capsules by mouth 2 times daily      Calcium Carb-Cholecalciferol (CALCIUM 500 +D PO) Take by mouth      Cranberry 300 MG TABS Take by mouth       No current facility-administered medications for this visit.     Social History     Socioeconomic History    Marital status:      Spouse name: Not on file    Number of children: Not on file    Years of education: Not on file    Highest education level: Not on file   Occupational History    Not on file   Tobacco Use    Smoking status: Never    Smokeless tobacco: Never   Substance and Sexual Activity    Alcohol use: Yes     Comment: occ    Drug use: No    Sexual activity: Yes

## 2024-05-14 ENCOUNTER — LAB (OUTPATIENT)
Dept: LAB | Facility: LAB | Age: 59
End: 2024-05-14
Payer: COMMERCIAL

## 2024-05-14 DIAGNOSIS — E78.5 HYPERLIPIDEMIA, UNSPECIFIED: ICD-10-CM

## 2024-05-14 DIAGNOSIS — I10 ESSENTIAL (PRIMARY) HYPERTENSION: ICD-10-CM

## 2024-05-14 DIAGNOSIS — D51.9 VITAMIN B12 DEFICIENCY ANEMIA, UNSPECIFIED: ICD-10-CM

## 2024-05-14 DIAGNOSIS — E55.9 VITAMIN D DEFICIENCY, UNSPECIFIED: ICD-10-CM

## 2024-05-14 DIAGNOSIS — R73.9 HYPERGLYCEMIA, UNSPECIFIED: Primary | ICD-10-CM

## 2024-05-14 LAB
25(OH)D3 SERPL-MCNC: 53 NG/ML (ref 30–100)
ALBUMIN SERPL BCP-MCNC: 4.4 G/DL (ref 3.4–5)
ALP SERPL-CCNC: 48 U/L (ref 33–136)
ALT SERPL W P-5'-P-CCNC: 28 U/L (ref 7–45)
ANION GAP SERPL CALC-SCNC: 14 MMOL/L (ref 10–20)
APPEARANCE UR: ABNORMAL
AST SERPL W P-5'-P-CCNC: 18 U/L (ref 9–39)
BACTERIA #/AREA URNS AUTO: ABNORMAL /HPF
BASOPHILS # BLD AUTO: 0.08 X10*3/UL (ref 0–0.1)
BASOPHILS NFR BLD AUTO: 1.2 %
BILIRUB SERPL-MCNC: 0.7 MG/DL (ref 0–1.2)
BILIRUB UR STRIP.AUTO-MCNC: NEGATIVE MG/DL
BUN SERPL-MCNC: 22 MG/DL (ref 6–23)
CALCIUM SERPL-MCNC: 9.8 MG/DL (ref 8.6–10.3)
CHLORIDE SERPL-SCNC: 108 MMOL/L (ref 98–107)
CHOLEST SERPL-MCNC: 144 MG/DL (ref 0–199)
CHOLESTEROL/HDL RATIO: 3.3
CO2 SERPL-SCNC: 25 MMOL/L (ref 21–32)
COLOR UR: ABNORMAL
CREAT SERPL-MCNC: 0.84 MG/DL (ref 0.5–1.05)
CREAT UR-MCNC: 32 MG/DL (ref 20–320)
CREAT UR-MCNC: 32 MG/DL (ref 20–320)
EGFRCR SERPLBLD CKD-EPI 2021: 80 ML/MIN/1.73M*2
EOSINOPHIL # BLD AUTO: 0.26 X10*3/UL (ref 0–0.7)
EOSINOPHIL NFR BLD AUTO: 3.8 %
ERYTHROCYTE [DISTWIDTH] IN BLOOD BY AUTOMATED COUNT: 12.2 % (ref 11.5–14.5)
EST. AVERAGE GLUCOSE BLD GHB EST-MCNC: 111 MG/DL
GLUCOSE SERPL-MCNC: 101 MG/DL (ref 74–99)
GLUCOSE UR STRIP.AUTO-MCNC: NEGATIVE MG/DL
HBA1C MFR BLD: 5.5 %
HCT VFR BLD AUTO: 46.5 % (ref 36–46)
HDLC SERPL-MCNC: 43.9 MG/DL
HGB BLD-MCNC: 15.2 G/DL (ref 12–16)
IMM GRANULOCYTES # BLD AUTO: 0.02 X10*3/UL (ref 0–0.7)
IMM GRANULOCYTES NFR BLD AUTO: 0.3 % (ref 0–0.9)
INSULIN P FAST SERPL-ACNC: 30 UIU/ML (ref 3–25)
KETONES UR STRIP.AUTO-MCNC: NEGATIVE MG/DL
LDLC SERPL CALC-MCNC: 70 MG/DL
LEUKOCYTE ESTERASE UR QL STRIP.AUTO: ABNORMAL
LYMPHOCYTES # BLD AUTO: 2.35 X10*3/UL (ref 1.2–4.8)
LYMPHOCYTES NFR BLD AUTO: 34.1 %
MAGNESIUM SERPL-MCNC: 2.05 MG/DL (ref 1.6–2.4)
MCH RBC QN AUTO: 31.7 PG (ref 26–34)
MCHC RBC AUTO-ENTMCNC: 32.7 G/DL (ref 32–36)
MCV RBC AUTO: 97 FL (ref 80–100)
MICROALBUMIN UR-MCNC: <7 MG/L
MICROALBUMIN/CREAT UR: NORMAL MG/G{CREAT}
MONOCYTES # BLD AUTO: 0.48 X10*3/UL (ref 0.1–1)
MONOCYTES NFR BLD AUTO: 7 %
MUCOUS THREADS #/AREA URNS AUTO: ABNORMAL /LPF
NEUTROPHILS # BLD AUTO: 3.7 X10*3/UL (ref 1.2–7.7)
NEUTROPHILS NFR BLD AUTO: 53.6 %
NITRITE UR QL STRIP.AUTO: NEGATIVE
NON HDL CHOLESTEROL: 100 MG/DL (ref 0–149)
NRBC BLD-RTO: 0 /100 WBCS (ref 0–0)
PH UR STRIP.AUTO: 6 [PH]
PLATELET # BLD AUTO: 261 X10*3/UL (ref 150–450)
POTASSIUM SERPL-SCNC: 4.1 MMOL/L (ref 3.5–5.3)
PROT SERPL-MCNC: 6.4 G/DL (ref 6.4–8.2)
PROT UR STRIP.AUTO-MCNC: NEGATIVE MG/DL
PROT UR-ACNC: 4 MG/DL (ref 5–24)
PROT/CREAT UR: 0.13 MG/MG CREAT (ref 0–0.17)
RBC # BLD AUTO: 4.79 X10*6/UL (ref 4–5.2)
RBC # UR STRIP.AUTO: NEGATIVE /UL
RBC #/AREA URNS AUTO: ABNORMAL /HPF
SODIUM SERPL-SCNC: 143 MMOL/L (ref 136–145)
SP GR UR STRIP.AUTO: 1.01
SQUAMOUS #/AREA URNS AUTO: ABNORMAL /HPF
TRIGL SERPL-MCNC: 149 MG/DL (ref 0–149)
TSH SERPL-ACNC: 2.47 MIU/L (ref 0.44–3.98)
UROBILINOGEN UR STRIP.AUTO-MCNC: <2 MG/DL
VIT B12 SERPL-MCNC: 387 PG/ML (ref 211–911)
VLDL: 30 MG/DL (ref 0–40)
WBC # BLD AUTO: 6.9 X10*3/UL (ref 4.4–11.3)
WBC #/AREA URNS AUTO: ABNORMAL /HPF

## 2024-05-14 PROCEDURE — 83036 HEMOGLOBIN GLYCOSYLATED A1C: CPT

## 2024-05-14 PROCEDURE — 36415 COLL VENOUS BLD VENIPUNCTURE: CPT

## 2024-05-14 PROCEDURE — 82043 UR ALBUMIN QUANTITATIVE: CPT

## 2024-05-14 PROCEDURE — 80061 LIPID PANEL: CPT

## 2024-05-14 PROCEDURE — 83735 ASSAY OF MAGNESIUM: CPT

## 2024-05-14 PROCEDURE — 85025 COMPLETE CBC W/AUTO DIFF WBC: CPT

## 2024-05-14 PROCEDURE — 82570 ASSAY OF URINE CREATININE: CPT

## 2024-05-14 PROCEDURE — 83525 ASSAY OF INSULIN: CPT

## 2024-05-14 PROCEDURE — 82607 VITAMIN B-12: CPT

## 2024-05-14 PROCEDURE — 81001 URINALYSIS AUTO W/SCOPE: CPT

## 2024-05-14 PROCEDURE — 84156 ASSAY OF PROTEIN URINE: CPT

## 2024-05-14 PROCEDURE — 82306 VITAMIN D 25 HYDROXY: CPT

## 2024-05-14 PROCEDURE — 80053 COMPREHEN METABOLIC PANEL: CPT

## 2024-05-14 PROCEDURE — 84443 ASSAY THYROID STIM HORMONE: CPT

## 2024-07-12 ENCOUNTER — HOSPITAL ENCOUNTER (OUTPATIENT)
Dept: RADIOLOGY | Facility: HOSPITAL | Age: 59
Discharge: HOME | End: 2024-07-12
Payer: COMMERCIAL

## 2024-07-12 VITALS — BODY MASS INDEX: 38.41 KG/M2 | WEIGHT: 225 LBS | HEIGHT: 64 IN

## 2024-07-12 DIAGNOSIS — R92.8 OTHER ABNORMAL AND INCONCLUSIVE FINDINGS ON DIAGNOSTIC IMAGING OF BREAST: ICD-10-CM

## 2024-07-12 PROCEDURE — 76642 ULTRASOUND BREAST LIMITED: CPT | Mod: LT

## 2024-07-12 PROCEDURE — 77061 BREAST TOMOSYNTHESIS UNI: CPT | Mod: LT

## 2024-10-24 ENCOUNTER — LAB (OUTPATIENT)
Dept: LAB | Facility: LAB | Age: 59
End: 2024-10-24
Payer: COMMERCIAL

## 2024-10-24 DIAGNOSIS — R73.9 HYPERGLYCEMIA, UNSPECIFIED: Primary | ICD-10-CM

## 2024-10-24 DIAGNOSIS — E78.5 HYPERLIPIDEMIA, UNSPECIFIED: ICD-10-CM

## 2024-10-24 DIAGNOSIS — E55.9 VITAMIN D DEFICIENCY, UNSPECIFIED: ICD-10-CM

## 2024-10-24 DIAGNOSIS — D51.9 VITAMIN B12 DEFICIENCY ANEMIA, UNSPECIFIED: ICD-10-CM

## 2024-10-24 DIAGNOSIS — I10 ESSENTIAL (PRIMARY) HYPERTENSION: ICD-10-CM

## 2024-10-24 LAB
25(OH)D3 SERPL-MCNC: 45 NG/ML (ref 30–100)
ALBUMIN SERPL BCP-MCNC: 4.2 G/DL (ref 3.4–5)
ALP SERPL-CCNC: 44 U/L (ref 33–136)
ALT SERPL W P-5'-P-CCNC: 26 U/L (ref 7–45)
ANION GAP SERPL CALC-SCNC: 12 MMOL/L (ref 10–20)
APPEARANCE UR: CLEAR
AST SERPL W P-5'-P-CCNC: 16 U/L (ref 9–39)
BASOPHILS # BLD AUTO: 0.06 X10*3/UL (ref 0–0.1)
BASOPHILS NFR BLD AUTO: 0.9 %
BILIRUB SERPL-MCNC: 0.7 MG/DL (ref 0–1.2)
BILIRUB UR STRIP.AUTO-MCNC: NEGATIVE MG/DL
BUN SERPL-MCNC: 20 MG/DL (ref 6–23)
CALCIUM SERPL-MCNC: 9.9 MG/DL (ref 8.6–10.3)
CHLORIDE SERPL-SCNC: 107 MMOL/L (ref 98–107)
CHOLEST SERPL-MCNC: 136 MG/DL (ref 0–199)
CHOLESTEROL/HDL RATIO: 3.8
CO2 SERPL-SCNC: 25 MMOL/L (ref 21–32)
COLOR UR: NORMAL
CREAT SERPL-MCNC: 0.83 MG/DL (ref 0.5–1.05)
CREAT UR-MCNC: 93.4 MG/DL (ref 20–320)
CREAT UR-MCNC: 93.4 MG/DL (ref 20–320)
EGFRCR SERPLBLD CKD-EPI 2021: 80 ML/MIN/1.73M*2
EOSINOPHIL # BLD AUTO: 0.22 X10*3/UL (ref 0–0.7)
EOSINOPHIL NFR BLD AUTO: 3.2 %
ERYTHROCYTE [DISTWIDTH] IN BLOOD BY AUTOMATED COUNT: 11.9 % (ref 11.5–14.5)
EST. AVERAGE GLUCOSE BLD GHB EST-MCNC: 108 MG/DL
GLUCOSE SERPL-MCNC: 94 MG/DL (ref 74–99)
GLUCOSE UR STRIP.AUTO-MCNC: NORMAL MG/DL
HBA1C MFR BLD: 5.4 %
HCT VFR BLD AUTO: 43.7 % (ref 36–46)
HDLC SERPL-MCNC: 35.8 MG/DL
HGB BLD-MCNC: 15 G/DL (ref 12–16)
IMM GRANULOCYTES # BLD AUTO: 0.01 X10*3/UL (ref 0–0.7)
IMM GRANULOCYTES NFR BLD AUTO: 0.1 % (ref 0–0.9)
INSULIN SERPL-ACNC: 32 UIU/ML (ref 3–25)
KETONES UR STRIP.AUTO-MCNC: NEGATIVE MG/DL
LDLC SERPL CALC-MCNC: 66 MG/DL
LEUKOCYTE ESTERASE UR QL STRIP.AUTO: NEGATIVE
LYMPHOCYTES # BLD AUTO: 2.29 X10*3/UL (ref 1.2–4.8)
LYMPHOCYTES NFR BLD AUTO: 33.5 %
MAGNESIUM SERPL-MCNC: 1.86 MG/DL (ref 1.6–2.4)
MCH RBC QN AUTO: 32.3 PG (ref 26–34)
MCHC RBC AUTO-ENTMCNC: 34.3 G/DL (ref 32–36)
MCV RBC AUTO: 94 FL (ref 80–100)
MICROALBUMIN UR-MCNC: <7 MG/L
MICROALBUMIN/CREAT UR: NORMAL MG/G{CREAT}
MONOCYTES # BLD AUTO: 0.58 X10*3/UL (ref 0.1–1)
MONOCYTES NFR BLD AUTO: 8.5 %
NEUTROPHILS # BLD AUTO: 3.68 X10*3/UL (ref 1.2–7.7)
NEUTROPHILS NFR BLD AUTO: 53.8 %
NITRITE UR QL STRIP.AUTO: NEGATIVE
NON HDL CHOLESTEROL: 100 MG/DL (ref 0–149)
NRBC BLD-RTO: 0 /100 WBCS (ref 0–0)
PH UR STRIP.AUTO: 6 [PH]
PLATELET # BLD AUTO: 250 X10*3/UL (ref 150–450)
POTASSIUM SERPL-SCNC: 4.1 MMOL/L (ref 3.5–5.3)
PROT SERPL-MCNC: 6.1 G/DL (ref 6.4–8.2)
PROT UR STRIP.AUTO-MCNC: NEGATIVE MG/DL
PROT UR-ACNC: 5 MG/DL (ref 5–24)
PROT/CREAT UR: 0.05 MG/MG CREAT (ref 0–0.17)
RBC # BLD AUTO: 4.64 X10*6/UL (ref 4–5.2)
RBC # UR STRIP.AUTO: NEGATIVE /UL
SODIUM SERPL-SCNC: 140 MMOL/L (ref 136–145)
SP GR UR STRIP.AUTO: 1.02
TRIGL SERPL-MCNC: 172 MG/DL (ref 0–149)
TSH SERPL-ACNC: 2.02 MIU/L (ref 0.44–3.98)
UROBILINOGEN UR STRIP.AUTO-MCNC: NORMAL MG/DL
VIT B12 SERPL-MCNC: 498 PG/ML (ref 211–911)
VLDL: 34 MG/DL (ref 0–40)
WBC # BLD AUTO: 6.8 X10*3/UL (ref 4.4–11.3)

## 2024-10-24 PROCEDURE — 85025 COMPLETE CBC W/AUTO DIFF WBC: CPT

## 2024-10-24 PROCEDURE — 82570 ASSAY OF URINE CREATININE: CPT

## 2024-10-24 PROCEDURE — 83735 ASSAY OF MAGNESIUM: CPT

## 2024-10-24 PROCEDURE — 82607 VITAMIN B-12: CPT

## 2024-10-24 PROCEDURE — 81003 URINALYSIS AUTO W/O SCOPE: CPT

## 2024-10-24 PROCEDURE — 84443 ASSAY THYROID STIM HORMONE: CPT

## 2024-10-24 PROCEDURE — 80061 LIPID PANEL: CPT

## 2024-10-24 PROCEDURE — 83036 HEMOGLOBIN GLYCOSYLATED A1C: CPT

## 2024-10-24 PROCEDURE — 83525 ASSAY OF INSULIN: CPT

## 2024-10-24 PROCEDURE — 82306 VITAMIN D 25 HYDROXY: CPT

## 2024-10-24 PROCEDURE — 80053 COMPREHEN METABOLIC PANEL: CPT

## 2024-10-24 PROCEDURE — 82043 UR ALBUMIN QUANTITATIVE: CPT

## 2024-10-24 PROCEDURE — 84156 ASSAY OF PROTEIN URINE: CPT

## 2024-10-29 ENCOUNTER — OFFICE VISIT (OUTPATIENT)
Dept: OBGYN CLINIC | Age: 59
End: 2024-10-29
Payer: COMMERCIAL

## 2024-10-29 VITALS
BODY MASS INDEX: 38.93 KG/M2 | DIASTOLIC BLOOD PRESSURE: 88 MMHG | WEIGHT: 228 LBS | SYSTOLIC BLOOD PRESSURE: 132 MMHG | HEIGHT: 64 IN

## 2024-10-29 DIAGNOSIS — Z12.4 CERVICAL CANCER SCREENING: ICD-10-CM

## 2024-10-29 DIAGNOSIS — Z01.419 ENCOUNTER FOR WELL WOMAN EXAM WITH ROUTINE GYNECOLOGICAL EXAM: ICD-10-CM

## 2024-10-29 DIAGNOSIS — Z12.31 SCREENING MAMMOGRAM FOR BREAST CANCER: ICD-10-CM

## 2024-10-29 DIAGNOSIS — Z11.51 SCREENING FOR HUMAN PAPILLOMAVIRUS: ICD-10-CM

## 2024-10-29 DIAGNOSIS — Z01.419 ENCOUNTER FOR WELL WOMAN EXAM WITH ROUTINE GYNECOLOGICAL EXAM: Primary | ICD-10-CM

## 2024-10-29 PROCEDURE — 99396 PREV VISIT EST AGE 40-64: CPT | Performed by: OBSTETRICS & GYNECOLOGY

## 2024-10-29 SDOH — ECONOMIC STABILITY: FOOD INSECURITY: WITHIN THE PAST 12 MONTHS, THE FOOD YOU BOUGHT JUST DIDN'T LAST AND YOU DIDN'T HAVE MONEY TO GET MORE.: NEVER TRUE

## 2024-10-29 SDOH — ECONOMIC STABILITY: FOOD INSECURITY: WITHIN THE PAST 12 MONTHS, YOU WORRIED THAT YOUR FOOD WOULD RUN OUT BEFORE YOU GOT MONEY TO BUY MORE.: NEVER TRUE

## 2024-10-29 SDOH — ECONOMIC STABILITY: INCOME INSECURITY: HOW HARD IS IT FOR YOU TO PAY FOR THE VERY BASICS LIKE FOOD, HOUSING, MEDICAL CARE, AND HEATING?: NOT HARD AT ALL

## 2024-10-29 ASSESSMENT — PATIENT HEALTH QUESTIONNAIRE - PHQ9
1. LITTLE INTEREST OR PLEASURE IN DOING THINGS: NOT AT ALL
SUM OF ALL RESPONSES TO PHQ QUESTIONS 1-9: 0
SUM OF ALL RESPONSES TO PHQ9 QUESTIONS 1 & 2: 0
2. FEELING DOWN, DEPRESSED OR HOPELESS: NOT AT ALL

## 2024-10-29 ASSESSMENT — ENCOUNTER SYMPTOMS
ALLERGIC/IMMUNOLOGIC NEGATIVE: 1
ANAL BLEEDING: 0
RESPIRATORY NEGATIVE: 1
RECTAL PAIN: 0
DIARRHEA: 0
EYES NEGATIVE: 1
CONSTIPATION: 0
VOMITING: 0
ABDOMINAL DISTENTION: 0
NAUSEA: 0
ABDOMINAL PAIN: 0
BLOOD IN STOOL: 0

## 2024-10-29 ASSESSMENT — VISUAL ACUITY: OU: 1

## 2024-10-29 NOTE — PROGRESS NOTES
Subjective:      Patient ID: Rosangela Muñiz is a 59 y.o. female    Annual exam. Reviewed medical, surgical, social and family history.  Also reviewed current medications and allergies.  No PMB.  No GYN complaints.  Pap performed.  Screening mammogram ordered.  Monthly SBE encouraged.  Screening colonoscopy recommended per routine.  F/U annual exam or prn.      Vitals:  /88   Ht 1.626 m (5' 4\")   Wt 103.4 kg (228 lb)   BMI 39.14 kg/m²   Past Medical History:   Diagnosis Date    Diverticulitis     Hyperlipidemia     Hypertension      Past Surgical History:   Procedure Laterality Date    CYST REMOVAL      outter left breast    SIGMOIDECTOMY       Allergies:  Patient has no known allergies.  Current Outpatient Medications   Medication Sig Dispense Refill    omeprazole (PRILOSEC) 40 MG delayed release capsule take 1 capsule once daily; 30 mins prior to breakfast      rosuvastatin (CRESTOR) 10 MG tablet TAKE 1 TABLET BY MOUTH ONCE DAILY      FENOFIBRATE PO Take by mouth      potassium chloride (KLOR-CON) 10 MEQ extended release tablet Take 1 tablet by mouth daily      torsemide (DEMADEX) 20 MG tablet Take 1 tablet by mouth daily      Multiple Vitamins-Minerals (THERAPEUTIC MULTIVITAMIN-MINERALS) tablet Take 1 tablet by mouth daily      Omega-3 Fatty Acids (FISH OIL) 1000 MG CAPS Take 3 capsules by mouth 2 times daily      Calcium Carb-Cholecalciferol (CALCIUM 500 +D PO) Take by mouth      Cranberry 300 MG TABS Take by mouth       No current facility-administered medications for this visit.     Social History     Socioeconomic History    Marital status:      Spouse name: Not on file    Number of children: Not on file    Years of education: Not on file    Highest education level: Not on file   Occupational History    Not on file   Tobacco Use    Smoking status: Never    Smokeless tobacco: Never   Substance and Sexual Activity    Alcohol use: Yes     Comment: occ    Drug use: No    Sexual activity: Yes

## 2024-10-29 NOTE — PROGRESS NOTES
The patient was asked if she would like a chaperone present for her intimate exam. She  Declined the chaperone. Michael Thornton CMA ([facility]AMA)

## 2024-11-02 LAB
HPV HR 12 DNA SPEC QL NAA+PROBE: NOT DETECTED
HPV16 DNA SPEC QL NAA+PROBE: NOT DETECTED
HPV16+18+H RISK 12 DNA SPEC-IMP: NORMAL
HPV18 DNA SPEC QL NAA+PROBE: NOT DETECTED

## 2025-01-08 DIAGNOSIS — R92.8 ABNORMAL MAMMOGRAM: Primary | ICD-10-CM

## 2025-01-10 ENCOUNTER — APPOINTMENT (OUTPATIENT)
Dept: RADIOLOGY | Facility: HOSPITAL | Age: 60
End: 2025-01-10
Payer: COMMERCIAL

## 2025-01-29 ENCOUNTER — HOSPITAL ENCOUNTER (OUTPATIENT)
Dept: RADIOLOGY | Facility: HOSPITAL | Age: 60
Discharge: HOME | End: 2025-01-29
Payer: COMMERCIAL

## 2025-01-29 VITALS — HEIGHT: 64 IN | WEIGHT: 225 LBS | BODY MASS INDEX: 38.41 KG/M2

## 2025-01-29 DIAGNOSIS — R92.8 OTHER ABNORMAL AND INCONCLUSIVE FINDINGS ON DIAGNOSTIC IMAGING OF BREAST: ICD-10-CM

## 2025-01-29 PROCEDURE — 76642 ULTRASOUND BREAST LIMITED: CPT | Performed by: RADIOLOGY

## 2025-01-29 PROCEDURE — 77062 BREAST TOMOSYNTHESIS BI: CPT | Performed by: RADIOLOGY

## 2025-01-29 PROCEDURE — 76642 ULTRASOUND BREAST LIMITED: CPT | Mod: LT

## 2025-01-29 PROCEDURE — 77066 DX MAMMO INCL CAD BI: CPT | Performed by: RADIOLOGY

## 2025-01-29 PROCEDURE — 77062 BREAST TOMOSYNTHESIS BI: CPT

## 2025-01-30 ENCOUNTER — DOCUMENTATION (OUTPATIENT)
Dept: SURGERY | Facility: HOSPITAL | Age: 60
End: 2025-01-30
Payer: COMMERCIAL

## 2025-01-30 ENCOUNTER — TELEPHONE (OUTPATIENT)
Dept: OBGYN CLINIC | Age: 60
End: 2025-01-30

## 2025-01-30 NOTE — PROGRESS NOTES
Breast navigator:    I met with the patient regarding her abnormal mammogram and radiologist recommendations for breast biopsy. Patient was given handouts and questions addressed. She has elected Dr. Lopez as her surgeon. Appointment and biopsy scheduled. Dr. Barth notified.

## 2025-01-30 NOTE — TELEPHONE ENCOUNTER
Received a call from Ellie( Nurse Navigator)  who wanted to let Dr. Kendrick know that the patient had a mammogram and result was abnormal.Caller stated that \"the patient will see Lindsay Martinez at [facility] and will have a left breast ultrasound guided biopsy\". Caller stated patient being taken care of and Dr. Kendrick does not need to do anything,results will be sent.    Any questions you may contact [phone]

## 2025-01-31 ENCOUNTER — APPOINTMENT (OUTPATIENT)
Dept: RADIOLOGY | Facility: HOSPITAL | Age: 60
End: 2025-01-31
Payer: COMMERCIAL

## 2025-01-31 ENCOUNTER — OFFICE VISIT (OUTPATIENT)
Dept: SURGERY | Facility: HOSPITAL | Age: 60
End: 2025-01-31
Payer: COMMERCIAL

## 2025-01-31 VITALS
SYSTOLIC BLOOD PRESSURE: 140 MMHG | DIASTOLIC BLOOD PRESSURE: 80 MMHG | WEIGHT: 228 LBS | BODY MASS INDEX: 38.93 KG/M2 | HEIGHT: 64 IN

## 2025-01-31 DIAGNOSIS — R92.8 ABNORMAL MAMMOGRAM: Primary | ICD-10-CM

## 2025-01-31 PROCEDURE — 3008F BODY MASS INDEX DOCD: CPT | Performed by: SURGERY

## 2025-01-31 PROCEDURE — 1036F TOBACCO NON-USER: CPT | Performed by: SURGERY

## 2025-01-31 PROCEDURE — 99204 OFFICE O/P NEW MOD 45 MIN: CPT | Performed by: SURGERY

## 2025-01-31 PROCEDURE — 99214 OFFICE O/P EST MOD 30 MIN: CPT | Performed by: SURGERY

## 2025-01-31 RX ORDER — ROSUVASTATIN CALCIUM 10 MG/1
10 TABLET, COATED ORAL DAILY
COMMUNITY

## 2025-01-31 RX ORDER — OMEGA-3S/DHA/EPA/FISH OIL 300-1000MG
CAPSULE ORAL
COMMUNITY

## 2025-01-31 RX ORDER — FENOFIBRATE 134 MG/1
134 CAPSULE ORAL DAILY
COMMUNITY

## 2025-01-31 RX ORDER — TORSEMIDE 20 MG/1
20 TABLET ORAL DAILY
COMMUNITY

## 2025-01-31 RX ORDER — MULTIVITAMIN
TABLET ORAL
COMMUNITY

## 2025-01-31 ASSESSMENT — ENCOUNTER SYMPTOMS
PSYCHIATRIC NEGATIVE: 1
EYES NEGATIVE: 1
CONSTITUTIONAL NEGATIVE: 1
ALLERGIC/IMMUNOLOGIC NEGATIVE: 1
NEUROLOGICAL NEGATIVE: 1
MUSCULOSKELETAL NEGATIVE: 1
HEMATOLOGIC/LYMPHATIC NEGATIVE: 1
ENDOCRINE NEGATIVE: 1
GASTROINTESTINAL NEGATIVE: 1
CARDIOVASCULAR NEGATIVE: 1
RESPIRATORY NEGATIVE: 1

## 2025-01-31 NOTE — PROGRESS NOTES
Subjective   Patient ID: Mireya Ramsey is a 59 y.o. female who presents for New Patient Visit (Biopsy consult/Chaperone declined).  Abnormal mammogram  HPI  Patient presents for abnormal breast imaging.  2025: Bilateral diagnostic mammogram with left breast ultrasound is reviewed: Right breast unremarkable.  Mammogram of the LEFT breast at the 12 to 1 o'clock position posterior depth, 6 cm from the nipple with an irregular mass with microlobulated slightly spiculated margins 9 x 6 x 7 mm.  Subsequent ultrasound reconfirms this irregular hypoechoic mass with indistinct margins 8 x 6 x 6 mm in the 12 o'clock position 6 cm from the nipple.  Unremarkable axilla.  Category 4    No breast complaints    Breast history:  , age 30, no BF  Prior history of skin  cyst excision left lateral breast.  No family history  Menarche 8, menopause approx 48, amenorrheic after Depo use discontinued    Review of Systems   Constitutional: Negative.    HENT: Negative.     Eyes: Negative.    Respiratory: Negative.     Cardiovascular: Negative.    Gastrointestinal: Negative.    Endocrine: Negative.    Genitourinary: Negative.    Musculoskeletal: Negative.    Skin: Negative.    Allergic/Immunologic: Negative.    Neurological: Negative.    Hematological: Negative.    Psychiatric/Behavioral: Negative.           History reviewed. No pertinent past medical history.  Past Surgical History:   Procedure Laterality Date    BREAST CYST EXCISION       Family History   Problem Relation Name Age of Onset    Hodgkin's lymphoma Father        Social History     Socioeconomic History    Marital status:    Tobacco Use    Smoking status: Never    Smokeless tobacco: Never     Social Drivers of Health     Financial Resource Strain: Low Risk  (10/29/2024)    Received from [facility]    Overall Financial Resource Strain (CARDIA)     Difficulty of Paying Living Expenses: Not hard at all   Food Insecurity: No Food Insecurity  (10/29/2024)    Received from [facility]    Hunger Vital Sign     Worried About Running Out of Food in the Last Year: Never true     Ran Out of Food in the Last Year: Never true   Transportation Needs: Unknown (10/29/2024)    Received from [facility]    PRAPARE - Transportation     Lack of Transportation (Non-Medical): No   Housing Stability: Unknown (10/29/2024)    Received from [facility]    Housing Stability Vital Sign     Homeless in the Last Year: No          Current Outpatient Medications:     calcium carbonate-vitamin D3 (Calcium 600 + D,3,) 600 mg-10 mcg (400 unit) tablet, Take by mouth., Disp: , Rfl:     cranberry/vitamin C/d-mannose (CRANBERRY-VITAMIN C-MANNOSE ORAL), Take by mouth., Disp: , Rfl:     fenofibrate micronized (Lofibra) 134 mg capsule, Take 1 capsule (134 mg) by mouth once daily., Disp: , Rfl:     omega-3s-dha-epa-fish oil (Omega-3 Fish OiL) 300-1,000 mg capsule, Take by mouth., Disp: , Rfl:     rosuvastatin (Crestor) 10 mg tablet, Take 1 tablet (10 mg) by mouth once daily., Disp: , Rfl:     torsemide (Demadex) 20 mg tablet, Take 1 tablet (20 mg) by mouth once daily., Disp: , Rfl:      Objective   Vitals:    01/31/25 1319   BP: 140/80      Physical Exam  Constitutional:       General: She is not in acute distress.     Appearance: Normal appearance.   HENT:      Head: Normocephalic and atraumatic.      Mouth/Throat:      Mouth: Mucous membranes are moist.   Eyes:      Pupils: Pupils are equal, round, and reactive to light.   Cardiovascular:      Rate and Rhythm: Normal rate and regular rhythm.   Pulmonary:      Effort: Pulmonary effort is normal.   Chest:      Chest wall: No mass, swelling or tenderness.   Breasts:     Right: Normal. No mass, nipple discharge, skin change or tenderness.      Left: Normal. No mass, nipple discharge, skin change or tenderness.   Abdominal:      General: Abdomen is flat.      Palpations: Abdomen  is soft.   Musculoskeletal:         General: Normal range of motion.      Cervical back: Normal range of motion.   Lymphadenopathy:      Cervical: No cervical adenopathy.      Upper Body:      Right upper body: No axillary adenopathy.      Left upper body: No axillary adenopathy.   Skin:     General: Skin is warm and dry.   Neurological:      Mental Status: She is alert. Mental status is at baseline.   Psychiatric:         Mood and Affect: Mood normal.           Assessment/Plan   Problem List Items Addressed This Visit    None  Visit Diagnoses         Codes    Abnormal mammogram    -  Primary R92.8          Imaging findings discussed.  For ultrasound-guided core biopsy LEFT breast.  Follow-up in the office for discussion regarding pathology and further recommendations.    Any Lopez MD

## 2025-02-06 ENCOUNTER — HOSPITAL ENCOUNTER (OUTPATIENT)
Dept: RADIOLOGY | Facility: HOSPITAL | Age: 60
Discharge: HOME | End: 2025-02-06
Payer: COMMERCIAL

## 2025-02-06 VITALS
SYSTOLIC BLOOD PRESSURE: 134 MMHG | RESPIRATION RATE: 18 BRPM | OXYGEN SATURATION: 99 % | HEART RATE: 78 BPM | DIASTOLIC BLOOD PRESSURE: 91 MMHG

## 2025-02-06 DIAGNOSIS — R92.8 ABNORMAL MAMMOGRAM: Primary | ICD-10-CM

## 2025-02-06 DIAGNOSIS — R92.8 ABNORMAL MAMMOGRAM: ICD-10-CM

## 2025-02-06 PROCEDURE — 19083 BX BREAST 1ST LESION US IMAG: CPT | Mod: LEFT SIDE | Performed by: RADIOLOGY

## 2025-02-06 PROCEDURE — 2500000004 HC RX 250 GENERAL PHARMACY W/ HCPCS (ALT 636 FOR OP/ED): Performed by: RADIOLOGY

## 2025-02-06 PROCEDURE — 77065 DX MAMMO INCL CAD UNI: CPT | Mod: LEFT SIDE | Performed by: RADIOLOGY

## 2025-02-06 PROCEDURE — A4648 IMPLANTABLE TISSUE MARKER: HCPCS

## 2025-02-06 PROCEDURE — 19083 BX BREAST 1ST LESION US IMAG: CPT | Mod: LT

## 2025-02-06 PROCEDURE — 2720000007 HC OR 272 NO HCPCS

## 2025-02-06 RX ORDER — LIDOCAINE HYDROCHLORIDE 20 MG/ML
INJECTION, SOLUTION EPIDURAL; INFILTRATION; INTRACAUDAL; PERINEURAL AS NEEDED
Status: COMPLETED | OUTPATIENT
Start: 2025-02-06 | End: 2025-02-06

## 2025-02-06 RX ADMIN — LIDOCAINE HYDROCHLORIDE 10 ML: 20 INJECTION, SOLUTION EPIDURAL; INFILTRATION; INTRACAUDAL; PERINEURAL at 09:14

## 2025-02-06 ASSESSMENT — PAIN - FUNCTIONAL ASSESSMENT
PAIN_FUNCTIONAL_ASSESSMENT: 0-10
PAIN_FUNCTIONAL_ASSESSMENT: 0-10

## 2025-02-06 ASSESSMENT — PAIN SCALES - GENERAL
PAINLEVEL_OUTOF10: 0 - NO PAIN
PAINLEVEL_OUTOF10: 0 - NO PAIN

## 2025-02-06 NOTE — DISCHARGE INSTRUCTIONS
ACTIVITY  You may return to work or other activities the day of your biopsy, providing that these activities do not include heavy lifting or strenuous athletic sports.  Do not shower or bathe for the next 24 hours.  You may take a sponge bath, providing you take care to keep the biopsy area dry.  You may return to all of your normal activities after 48 hours as tolerated.  MEDICATIONS  Avoid aspirin for the next 24 hours. Use Tylenol if needed for discomfort over the next 48 hours.   Continue taking your normal medications as usual, except as noted below.  DRESSINGS  Keep the gauze dressing and your bra on day and night for the next 24 hours.  Remove the dressing 24 hours after the biopsy. You may bathe or shower at that time. The steri strips may come off when wet. Use a band aid if needed. Steri strips should be on no longer than 5 days.  Use ice pack if desired: 1st 24 hours- on 15 minutes, off 15 minutes.  OTHER INSTRUCTIONS  Watch for excessive bleeding, swelling, redness, pain or fever. If any of these occur, contact [facility] at [phone] between 8am and 5pm, or the Radiologists answering service at [phone] after 5pm. The Mammography department may be reached at [phone] Monday - Friday 7:30 am - 3:30 pm.  **For several days or even a couple of weeks, you may feel mild tenderness, “twinges”, or a small bump at the biopsy puncture site. This is normal. Applying moist heat to the area after 2 days may bring relief. This will disappear with time.    Dr. Lopez [phone]    Emy Watson Nurse Navigator [phone]

## 2025-02-12 ENCOUNTER — OFFICE VISIT (OUTPATIENT)
Dept: SURGERY | Facility: HOSPITAL | Age: 60
End: 2025-02-12
Payer: COMMERCIAL

## 2025-02-12 VITALS — WEIGHT: 228 LBS | HEIGHT: 64 IN | BODY MASS INDEX: 38.93 KG/M2

## 2025-02-12 DIAGNOSIS — C50.912 MALIGNANT NEOPLASM OF LEFT FEMALE BREAST, UNSPECIFIED ESTROGEN RECEPTOR STATUS, UNSPECIFIED SITE OF BREAST: Primary | ICD-10-CM

## 2025-02-12 PROCEDURE — 99214 OFFICE O/P EST MOD 30 MIN: CPT | Performed by: SURGERY

## 2025-02-12 PROCEDURE — 3008F BODY MASS INDEX DOCD: CPT | Performed by: SURGERY

## 2025-02-12 RX ORDER — CEFAZOLIN SODIUM 2 G/100ML
2 INJECTION, SOLUTION INTRAVENOUS ONCE
OUTPATIENT
Start: 2025-02-12 | End: 2025-02-12

## 2025-02-12 ASSESSMENT — ENCOUNTER SYMPTOMS
GASTROINTESTINAL NEGATIVE: 1
CONSTITUTIONAL NEGATIVE: 1
NEUROLOGICAL NEGATIVE: 1
ENDOCRINE NEGATIVE: 1
CARDIOVASCULAR NEGATIVE: 1
RESPIRATORY NEGATIVE: 1
MUSCULOSKELETAL NEGATIVE: 1
PSYCHIATRIC NEGATIVE: 1
ALLERGIC/IMMUNOLOGIC NEGATIVE: 1
EYES NEGATIVE: 1
HEMATOLOGIC/LYMPHATIC NEGATIVE: 1

## 2025-02-12 NOTE — PROGRESS NOTES
Subjective   Patient ID: Mireya Ramsey is a 59 y.o. female who presents for Follow-up (Biopsy follow up).  Abnormal mammogram  HPI  Status post ultrasound-guided core biopsy of the left breast mass, invasive ductal carcinoma grade 2 ER greater than 95% AL negative, HER2 negative    Preliminary staging cT1 N0    Patient presents for abnormal breast imaging.  2025: Bilateral diagnostic mammogram with left breast ultrasound is reviewed: Right breast unremarkable.  Mammogram of the LEFT breast at the 12 to 1 o'clock position posterior depth, 6 cm from the nipple with an irregular mass with microlobulated slightly spiculated margins 9 x 6 x 7 mm.  Subsequent ultrasound reconfirms this irregular hypoechoic mass with indistinct margins 8 x 6 x 6 mm in the 12 o'clock position 6 cm from the nipple.  Unremarkable axilla.  Category 4        No breast complaints    Breast history:  , age 30, no BF  Prior history of skin  cyst excision left lateral breast.  No family history  Menarche 8, menopause approx 48, amenorrheic after Depo use discontinued    Review of Systems   Constitutional: Negative.    HENT: Negative.     Eyes: Negative.    Respiratory: Negative.     Cardiovascular: Negative.    Gastrointestinal: Negative.    Endocrine: Negative.    Genitourinary: Negative.    Musculoskeletal: Negative.    Skin: Negative.    Allergic/Immunologic: Negative.    Neurological: Negative.    Hematological: Negative.    Psychiatric/Behavioral: Negative.           No past medical history on file.  Past Surgical History:   Procedure Laterality Date    BREAST CYST EXCISION       Family History   Problem Relation Name Age of Onset    Hodgkin's lymphoma Father        Social History     Socioeconomic History    Marital status:    Tobacco Use    Smoking status: Never    Smokeless tobacco: Never     Social Drivers of Health     Financial Resource Strain: Low Risk  (10/29/2024)    Received from [facility]     Overall Financial Resource Strain (CARDIA)     Difficulty of Paying Living Expenses: Not hard at all   Food Insecurity: No Food Insecurity (10/29/2024)    [facility]    Hunger Vital Sign     Worried About Running Out of Food in the Last Year: Never true     Ran Out of Food in the Last Year: Never true   Transportation Needs: Unknown (10/29/2024)    [facility]    PRAPARE - Transportation     Lack of Transportation (Non-Medical): No   Housing Stability: Unknown (10/29/2024)    [facility]    Housing Stability Vital Sign     Homeless in the Last Year: No          Current Outpatient Medications:     calcium carbonate-vitamin D3 (Calcium 600 + D,3,) 600 mg-10 mcg (400 unit) tablet, Take by mouth., Disp: , Rfl:     cranberry/vitamin C/d-mannose (CRANBERRY-VITAMIN C-MANNOSE ORAL), Take by mouth., Disp: , Rfl:     fenofibrate micronized (Lofibra) 134 mg capsule, Take 1 capsule (134 mg) by mouth once daily., Disp: , Rfl:     omega-3s-dha-epa-fish oil (Omega-3 Fish OiL) 300-1,000 mg capsule, Take by mouth., Disp: , Rfl:     rosuvastatin (Crestor) 10 mg tablet, Take 1 tablet (10 mg) by mouth once daily., Disp: , Rfl:     torsemide (Demadex) 20 mg tablet, Take 1 tablet (20 mg) by mouth once daily., Disp: , Rfl:      Objective   There were no vitals filed for this visit.     Physical Exam  Constitutional:       General: She is not in acute distress.     Appearance: Normal appearance.   HENT:      Head: Normocephalic and atraumatic.      Mouth/Throat:      Mouth: Mucous membranes are moist.   Eyes:      Pupils: Pupils are equal, round, and reactive to light.   Cardiovascular:      Rate and Rhythm: Normal rate and regular rhythm.   Pulmonary:      Effort: Pulmonary effort is normal.   Chest:      Chest wall: No mass, swelling or tenderness.   Breasts:     Right: Normal. No mass, nipple discharge, skin change or tenderness.       Left: Normal. No mass, nipple discharge, skin change or tenderness.   Abdominal:      General: Abdomen is flat.      Palpations: Abdomen is soft.   Musculoskeletal:         General: Normal range of motion.      Cervical back: Normal range of motion.   Lymphadenopathy:      Cervical: No cervical adenopathy.      Upper Body:      Right upper body: No axillary adenopathy.      Left upper body: No axillary adenopathy.   Skin:     General: Skin is warm and dry.   Neurological:      Mental Status: She is alert. Mental status is at baseline.   Psychiatric:         Mood and Affect: Mood normal.           Assessment/Plan   Problem List Items Addressed This Visit             ICD-10-CM    Malignant neoplasm of left female breast - Primary C50.912    Relevant Orders    Case Request Operating Room: MASTECTOMY, PARTIAL, BIOPSY, LYMPH NODE, AXILLARY (Completed)     Pathology findings discussed in detail.  Surgical options including mastectomy versus partial mastectomy, either option to include sentinel lymph node biopsy possible axillary node dissection, with inherent risks of bleeding, infection, need for additional procedures including margin positivity or axillary upstaging, anesthesia complications cardiac/respiratory/thrombotic.  Adjuvant therapies including radiation, chemotherapy and endocrine therapy discussed.  Understands and wishes to proceed.      Any Lopez MD

## 2025-02-14 LAB
LAB AP ASR DISCLAIMER: NORMAL
LABORATORY COMMENT REPORT: NORMAL
PATH REPORT.FINAL DX SPEC: NORMAL
PATH REPORT.GROSS SPEC: NORMAL
PATH REPORT.RELEVANT HX SPEC: NORMAL
PATH REPORT.TOTAL CANCER: NORMAL
PATHOLOGY SYNOPTIC REPORT: NORMAL

## 2025-02-18 PROBLEM — C50.912 MALIGNANT NEOPLASM OF LEFT FEMALE BREAST: Status: ACTIVE | Noted: 2025-02-12

## 2025-02-19 ENCOUNTER — APPOINTMENT (OUTPATIENT)
Dept: SURGERY | Facility: HOSPITAL | Age: 60
End: 2025-02-19
Payer: COMMERCIAL

## 2025-02-27 ENCOUNTER — HOSPITAL ENCOUNTER (OUTPATIENT)
Dept: RADIOLOGY | Facility: HOSPITAL | Age: 60
Discharge: HOME | End: 2025-02-27
Payer: COMMERCIAL

## 2025-02-27 DIAGNOSIS — Z17.0 MALIGNANT NEOPLASM OF CENTRAL PORTION OF LEFT BREAST IN FEMALE, ESTROGEN RECEPTOR POSITIVE: ICD-10-CM

## 2025-02-27 DIAGNOSIS — C50.112 MALIGNANT NEOPLASM OF CENTRAL PORTION OF LEFT BREAST IN FEMALE, ESTROGEN RECEPTOR POSITIVE: ICD-10-CM

## 2025-02-27 PROCEDURE — 77065 DX MAMMO INCL CAD UNI: CPT | Mod: LEFT SIDE | Performed by: RADIOLOGY

## 2025-02-27 PROCEDURE — 19285 PERQ DEV BREAST 1ST US IMAG: CPT | Mod: LEFT SIDE | Performed by: RADIOLOGY

## 2025-02-27 PROCEDURE — A4648 IMPLANTABLE TISSUE MARKER: HCPCS

## 2025-02-27 PROCEDURE — 2780000003 HC OR 278 NO HCPCS

## 2025-02-27 PROCEDURE — 19285 PERQ DEV BREAST 1ST US IMAG: CPT | Mod: LT

## 2025-02-27 PROCEDURE — 2500000004 HC RX 250 GENERAL PHARMACY W/ HCPCS (ALT 636 FOR OP/ED): Performed by: RADIOLOGY

## 2025-02-27 RX ORDER — LIDOCAINE HYDROCHLORIDE 20 MG/ML
INJECTION, SOLUTION EPIDURAL; INFILTRATION; INTRACAUDAL; PERINEURAL AS NEEDED
Status: COMPLETED | OUTPATIENT
Start: 2025-02-27 | End: 2025-02-27

## 2025-02-27 RX ADMIN — LIDOCAINE HYDROCHLORIDE 2 ML: 20 INJECTION, SOLUTION EPIDURAL; INFILTRATION; INTRACAUDAL; PERINEURAL at 08:51

## 2025-03-05 RX ORDER — IBUPROFEN 800 MG/1
800 TABLET ORAL EVERY 8 HOURS PRN
COMMUNITY

## 2025-03-05 RX ORDER — POTASSIUM CHLORIDE 1.5 G/1.58G
20 POWDER, FOR SOLUTION ORAL DAILY
COMMUNITY

## 2025-03-05 RX ORDER — OMEPRAZOLE 40 MG/1
40 CAPSULE, DELAYED RELEASE ORAL
COMMUNITY

## 2025-03-05 NOTE — PREPROCEDURE INSTRUCTIONS

## 2025-03-20 ENCOUNTER — ANESTHESIA EVENT (OUTPATIENT)
Dept: OPERATING ROOM | Facility: HOSPITAL | Age: 60
End: 2025-03-20
Payer: COMMERCIAL

## 2025-03-20 ENCOUNTER — ANESTHESIA (OUTPATIENT)
Dept: OPERATING ROOM | Facility: HOSPITAL | Age: 60
End: 2025-03-20
Payer: COMMERCIAL

## 2025-03-20 ENCOUNTER — APPOINTMENT (OUTPATIENT)
Dept: RADIOLOGY | Facility: HOSPITAL | Age: 60
End: 2025-03-20
Payer: COMMERCIAL

## 2025-03-20 ENCOUNTER — HOSPITAL ENCOUNTER (OUTPATIENT)
Dept: RADIOLOGY | Facility: HOSPITAL | Age: 60
Setting detail: OUTPATIENT SURGERY
Discharge: HOME | End: 2025-03-20
Payer: COMMERCIAL

## 2025-03-20 ENCOUNTER — HOSPITAL ENCOUNTER (OUTPATIENT)
Facility: HOSPITAL | Age: 60
Setting detail: OUTPATIENT SURGERY
Discharge: HOME | End: 2025-03-20
Attending: SURGERY | Admitting: SURGERY
Payer: COMMERCIAL

## 2025-03-20 VITALS
OXYGEN SATURATION: 97 % | SYSTOLIC BLOOD PRESSURE: 142 MMHG | HEIGHT: 64 IN | WEIGHT: 230.6 LBS | BODY MASS INDEX: 39.37 KG/M2 | RESPIRATION RATE: 18 BRPM | TEMPERATURE: 97.2 F | HEART RATE: 73 BPM | DIASTOLIC BLOOD PRESSURE: 83 MMHG

## 2025-03-20 DIAGNOSIS — C50.912 MALIGNANT NEOPLASM OF LEFT FEMALE BREAST, UNSPECIFIED ESTROGEN RECEPTOR STATUS, UNSPECIFIED SITE OF BREAST: ICD-10-CM

## 2025-03-20 DIAGNOSIS — C50.912 MALIGNANT NEOPLASM OF LEFT FEMALE BREAST, UNSPECIFIED ESTROGEN RECEPTOR STATUS, UNSPECIFIED SITE OF BREAST: Primary | ICD-10-CM

## 2025-03-20 PROBLEM — E78.5 HYPERLIPIDEMIA: Status: ACTIVE | Noted: 2025-03-20

## 2025-03-20 PROBLEM — K21.9 GASTROESOPHAGEAL REFLUX DISEASE: Status: ACTIVE | Noted: 2025-03-20

## 2025-03-20 PROBLEM — I10 HTN (HYPERTENSION): Status: ACTIVE | Noted: 2025-03-20

## 2025-03-20 PROBLEM — M19.90 OSTEOARTHRITIS: Status: ACTIVE | Noted: 2025-03-20

## 2025-03-20 PROBLEM — E66.9 OBESITY: Status: ACTIVE | Noted: 2025-03-20

## 2025-03-20 PROBLEM — H91.90 HEARING LOSS: Status: ACTIVE | Noted: 2025-03-20

## 2025-03-20 PROCEDURE — 88332 PATH CONSLTJ SURG EA ADD BLK: CPT | Mod: TC,SUR,ELYLAB | Performed by: SURGERY

## 2025-03-20 PROCEDURE — 7100000009 HC PHASE TWO TIME - INITIAL BASE CHARGE: Performed by: SURGERY

## 2025-03-20 PROCEDURE — A9520 TC99 TILMANOCEPT DIAG 0.5MCI: HCPCS | Performed by: SURGERY

## 2025-03-20 PROCEDURE — 38792 RA TRACER ID OF SENTINL NODE: CPT

## 2025-03-20 PROCEDURE — 2720000007 HC OR 272 NO HCPCS: Performed by: SURGERY

## 2025-03-20 PROCEDURE — 7100000002 HC RECOVERY ROOM TIME - EACH INCREMENTAL 1 MINUTE: Performed by: SURGERY

## 2025-03-20 PROCEDURE — 76098 X-RAY EXAM SURGICAL SPECIMEN: CPT | Mod: LT

## 2025-03-20 PROCEDURE — 88307 TISSUE EXAM BY PATHOLOGIST: CPT | Performed by: STUDENT IN AN ORGANIZED HEALTH CARE EDUCATION/TRAINING PROGRAM

## 2025-03-20 PROCEDURE — 3430000001 HC RX 343 DIAGNOSTIC RADIOPHARMACEUTICALS: Performed by: SURGERY

## 2025-03-20 PROCEDURE — 3700000002 HC GENERAL ANESTHESIA TIME - EACH INCREMENTAL 1 MINUTE: Performed by: SURGERY

## 2025-03-20 PROCEDURE — 2500000004 HC RX 250 GENERAL PHARMACY W/ HCPCS (ALT 636 FOR OP/ED): Performed by: STUDENT IN AN ORGANIZED HEALTH CARE EDUCATION/TRAINING PROGRAM

## 2025-03-20 PROCEDURE — 7100000010 HC PHASE TWO TIME - EACH INCREMENTAL 1 MINUTE: Performed by: SURGERY

## 2025-03-20 PROCEDURE — 2500000005 HC RX 250 GENERAL PHARMACY W/O HCPCS: Performed by: SURGERY

## 2025-03-20 PROCEDURE — 3600000009 HC OR TIME - EACH INCREMENTAL 1 MINUTE - PROCEDURE LEVEL FOUR: Performed by: SURGERY

## 2025-03-20 PROCEDURE — 2500000004 HC RX 250 GENERAL PHARMACY W/ HCPCS (ALT 636 FOR OP/ED): Performed by: SURGERY

## 2025-03-20 PROCEDURE — 19301 PARTIAL MASTECTOMY: CPT | Performed by: SURGERY

## 2025-03-20 PROCEDURE — 88331 PATH CONSLTJ SURG 1 BLK 1SPC: CPT | Performed by: STUDENT IN AN ORGANIZED HEALTH CARE EDUCATION/TRAINING PROGRAM

## 2025-03-20 PROCEDURE — 7100000001 HC RECOVERY ROOM TIME - INITIAL BASE CHARGE: Performed by: SURGERY

## 2025-03-20 PROCEDURE — 38525 BIOPSY/REMOVAL LYMPH NODES: CPT | Performed by: SURGERY

## 2025-03-20 PROCEDURE — 3600000004 HC OR TIME - INITIAL BASE CHARGE - PROCEDURE LEVEL FOUR: Performed by: SURGERY

## 2025-03-20 PROCEDURE — 88307 TISSUE EXAM BY PATHOLOGIST: CPT | Mod: TC,ELYLAB | Performed by: SURGERY

## 2025-03-20 PROCEDURE — 88332 PATH CONSLTJ SURG EA ADD BLK: CPT | Performed by: STUDENT IN AN ORGANIZED HEALTH CARE EDUCATION/TRAINING PROGRAM

## 2025-03-20 PROCEDURE — 3700000001 HC GENERAL ANESTHESIA TIME - INITIAL BASE CHARGE: Performed by: SURGERY

## 2025-03-20 PROCEDURE — 38792 RA TRACER ID OF SENTINL NODE: CPT | Performed by: RADIOLOGY

## 2025-03-20 RX ORDER — MIDAZOLAM HYDROCHLORIDE 1 MG/ML
INJECTION, SOLUTION INTRAMUSCULAR; INTRAVENOUS AS NEEDED
Status: DISCONTINUED | OUTPATIENT
Start: 2025-03-20 | End: 2025-03-20

## 2025-03-20 RX ORDER — METOCLOPRAMIDE HYDROCHLORIDE 5 MG/ML
10 INJECTION INTRAMUSCULAR; INTRAVENOUS ONCE AS NEEDED
Status: DISCONTINUED | OUTPATIENT
Start: 2025-03-20 | End: 2025-03-20 | Stop reason: HOSPADM

## 2025-03-20 RX ORDER — OXYCODONE AND ACETAMINOPHEN 5; 325 MG/1; MG/1
1 TABLET ORAL EVERY 6 HOURS PRN
Qty: 12 TABLET | Refills: 0 | Status: SHIPPED | OUTPATIENT
Start: 2025-03-20 | End: 2025-03-25

## 2025-03-20 RX ORDER — ONDANSETRON HYDROCHLORIDE 2 MG/ML
INJECTION, SOLUTION INTRAVENOUS AS NEEDED
Status: DISCONTINUED | OUTPATIENT
Start: 2025-03-20 | End: 2025-03-20

## 2025-03-20 RX ORDER — SODIUM CHLORIDE, SODIUM LACTATE, POTASSIUM CHLORIDE, CALCIUM CHLORIDE 600; 310; 30; 20 MG/100ML; MG/100ML; MG/100ML; MG/100ML
100 INJECTION, SOLUTION INTRAVENOUS CONTINUOUS
Status: DISCONTINUED | OUTPATIENT
Start: 2025-03-20 | End: 2025-03-20 | Stop reason: HOSPADM

## 2025-03-20 RX ORDER — ROCURONIUM BROMIDE 10 MG/ML
INJECTION, SOLUTION INTRAVENOUS AS NEEDED
Status: DISCONTINUED | OUTPATIENT
Start: 2025-03-20 | End: 2025-03-20

## 2025-03-20 RX ORDER — METHYLENE BLUE 10 MG/ML
INJECTION INTRAVENOUS AS NEEDED
Status: DISCONTINUED | OUTPATIENT
Start: 2025-03-20 | End: 2025-03-20 | Stop reason: HOSPADM

## 2025-03-20 RX ORDER — HYDRALAZINE HYDROCHLORIDE 20 MG/ML
5 INJECTION INTRAMUSCULAR; INTRAVENOUS EVERY 30 MIN PRN
Status: DISCONTINUED | OUTPATIENT
Start: 2025-03-20 | End: 2025-03-20 | Stop reason: HOSPADM

## 2025-03-20 RX ORDER — LIDOCAINE HYDROCHLORIDE 10 MG/ML
0.1 INJECTION, SOLUTION EPIDURAL; INFILTRATION; INTRACAUDAL; PERINEURAL ONCE
Status: DISCONTINUED | OUTPATIENT
Start: 2025-03-20 | End: 2025-03-20 | Stop reason: HOSPADM

## 2025-03-20 RX ORDER — OXYCODONE HYDROCHLORIDE 5 MG/1
5 TABLET ORAL EVERY 4 HOURS PRN
Status: DISCONTINUED | OUTPATIENT
Start: 2025-03-20 | End: 2025-03-20 | Stop reason: HOSPADM

## 2025-03-20 RX ORDER — ACETAMINOPHEN 325 MG/1
650 TABLET ORAL EVERY 4 HOURS PRN
Status: DISCONTINUED | OUTPATIENT
Start: 2025-03-20 | End: 2025-03-20 | Stop reason: HOSPADM

## 2025-03-20 RX ORDER — FENTANYL CITRATE 50 UG/ML
25 INJECTION, SOLUTION INTRAMUSCULAR; INTRAVENOUS EVERY 5 MIN PRN
Status: DISCONTINUED | OUTPATIENT
Start: 2025-03-20 | End: 2025-03-20 | Stop reason: HOSPADM

## 2025-03-20 RX ORDER — CEFAZOLIN 1 G/1
INJECTION, POWDER, FOR SOLUTION INTRAVENOUS AS NEEDED
Status: DISCONTINUED | OUTPATIENT
Start: 2025-03-20 | End: 2025-03-20

## 2025-03-20 RX ORDER — ONDANSETRON HYDROCHLORIDE 2 MG/ML
4 INJECTION, SOLUTION INTRAVENOUS ONCE AS NEEDED
Status: DISCONTINUED | OUTPATIENT
Start: 2025-03-20 | End: 2025-03-20 | Stop reason: HOSPADM

## 2025-03-20 RX ORDER — FENTANYL CITRATE 50 UG/ML
12.5 INJECTION, SOLUTION INTRAMUSCULAR; INTRAVENOUS EVERY 5 MIN PRN
Status: DISCONTINUED | OUTPATIENT
Start: 2025-03-20 | End: 2025-03-20 | Stop reason: HOSPADM

## 2025-03-20 RX ORDER — SODIUM CHLORIDE 0.9 G/100ML
INJECTION, SOLUTION IRRIGATION AS NEEDED
Status: DISCONTINUED | OUTPATIENT
Start: 2025-03-20 | End: 2025-03-20 | Stop reason: HOSPADM

## 2025-03-20 RX ORDER — OXYCODONE HYDROCHLORIDE 5 MG/1
10 TABLET ORAL EVERY 4 HOURS PRN
Status: DISCONTINUED | OUTPATIENT
Start: 2025-03-20 | End: 2025-03-20 | Stop reason: HOSPADM

## 2025-03-20 RX ORDER — BUPIVACAINE HCL/EPINEPHRINE 0.25-.0005
VIAL (ML) INJECTION AS NEEDED
Status: DISCONTINUED | OUTPATIENT
Start: 2025-03-20 | End: 2025-03-20 | Stop reason: HOSPADM

## 2025-03-20 RX ORDER — SODIUM CHLORIDE, SODIUM LACTATE, POTASSIUM CHLORIDE, CALCIUM CHLORIDE 600; 310; 30; 20 MG/100ML; MG/100ML; MG/100ML; MG/100ML
50 INJECTION, SOLUTION INTRAVENOUS CONTINUOUS
Status: CANCELLED | OUTPATIENT
Start: 2025-03-20 | End: 2025-03-21

## 2025-03-20 RX ORDER — FENTANYL CITRATE 50 UG/ML
INJECTION, SOLUTION INTRAMUSCULAR; INTRAVENOUS AS NEEDED
Status: DISCONTINUED | OUTPATIENT
Start: 2025-03-20 | End: 2025-03-20

## 2025-03-20 RX ORDER — ALBUTEROL SULFATE 0.83 MG/ML
2.5 SOLUTION RESPIRATORY (INHALATION) ONCE AS NEEDED
Status: DISCONTINUED | OUTPATIENT
Start: 2025-03-20 | End: 2025-03-20 | Stop reason: HOSPADM

## 2025-03-20 RX ORDER — PROPOFOL 10 MG/ML
INJECTION, EMULSION INTRAVENOUS AS NEEDED
Status: DISCONTINUED | OUTPATIENT
Start: 2025-03-20 | End: 2025-03-20

## 2025-03-20 RX ORDER — LIDOCAINE HYDROCHLORIDE 20 MG/ML
INJECTION, SOLUTION INFILTRATION; PERINEURAL AS NEEDED
Status: DISCONTINUED | OUTPATIENT
Start: 2025-03-20 | End: 2025-03-20

## 2025-03-20 RX ORDER — LABETALOL HYDROCHLORIDE 5 MG/ML
5 INJECTION, SOLUTION INTRAVENOUS ONCE AS NEEDED
Status: DISCONTINUED | OUTPATIENT
Start: 2025-03-20 | End: 2025-03-20 | Stop reason: HOSPADM

## 2025-03-20 RX ORDER — CEFAZOLIN SODIUM 2 G/100ML
2 INJECTION, SOLUTION INTRAVENOUS ONCE
Status: DISCONTINUED | OUTPATIENT
Start: 2025-03-20 | End: 2025-03-20 | Stop reason: HOSPADM

## 2025-03-20 RX ADMIN — ONDANSETRON 4 MG: 2 INJECTION, SOLUTION INTRAMUSCULAR; INTRAVENOUS at 12:10

## 2025-03-20 RX ADMIN — LIDOCAINE HYDROCHLORIDE 100 MG: 20 INJECTION, SOLUTION INFILTRATION; PERINEURAL at 10:49

## 2025-03-20 RX ADMIN — CEFAZOLIN 2 G: 330 INJECTION, POWDER, FOR SOLUTION INTRAMUSCULAR; INTRAVENOUS at 10:55

## 2025-03-20 RX ADMIN — MIDAZOLAM 2 MG: 1 INJECTION INTRAMUSCULAR; INTRAVENOUS at 10:43

## 2025-03-20 RX ADMIN — DEXAMETHASONE SODIUM PHOSPHATE 4 MG: 4 INJECTION, SOLUTION INTRAMUSCULAR; INTRAVENOUS at 10:55

## 2025-03-20 RX ADMIN — SUGAMMADEX 200 MG: 100 INJECTION, SOLUTION INTRAVENOUS at 12:29

## 2025-03-20 RX ADMIN — FENTANYL CITRATE 50 MCG: 50 INJECTION, SOLUTION INTRAMUSCULAR; INTRAVENOUS at 10:52

## 2025-03-20 RX ADMIN — Medication 1.2 MILLICURIE: at 10:01

## 2025-03-20 RX ADMIN — PROPOFOL 200 MG: 10 INJECTION, EMULSION INTRAVENOUS at 10:49

## 2025-03-20 RX ADMIN — SODIUM CHLORIDE, POTASSIUM CHLORIDE, SODIUM LACTATE AND CALCIUM CHLORIDE: 600; 310; 30; 20 INJECTION, SOLUTION INTRAVENOUS at 10:45

## 2025-03-20 RX ADMIN — ROCURONIUM BROMIDE 50 MG: 10 SOLUTION INTRAVENOUS at 10:49

## 2025-03-20 RX ADMIN — FENTANYL CITRATE 50 MCG: 50 INJECTION, SOLUTION INTRAMUSCULAR; INTRAVENOUS at 10:49

## 2025-03-20 SDOH — HEALTH STABILITY: MENTAL HEALTH: CURRENT SMOKER: 0

## 2025-03-20 ASSESSMENT — PAIN - FUNCTIONAL ASSESSMENT
PAIN_FUNCTIONAL_ASSESSMENT: 0-10
PAIN_FUNCTIONAL_ASSESSMENT: UNABLE TO SELF-REPORT

## 2025-03-20 ASSESSMENT — COLUMBIA-SUICIDE SEVERITY RATING SCALE - C-SSRS
1. IN THE PAST MONTH, HAVE YOU WISHED YOU WERE DEAD OR WISHED YOU COULD GO TO SLEEP AND NOT WAKE UP?: NO
6. HAVE YOU EVER DONE ANYTHING, STARTED TO DO ANYTHING, OR PREPARED TO DO ANYTHING TO END YOUR LIFE?: NO
2. HAVE YOU ACTUALLY HAD ANY THOUGHTS OF KILLING YOURSELF?: NO

## 2025-03-20 ASSESSMENT — PAIN SCALES - GENERAL
PAINLEVEL_OUTOF10: 0 - NO PAIN
PAIN_LEVEL: 0
PAINLEVEL_OUTOF10: 0 - NO PAIN
PAINLEVEL_OUTOF10: 2
PAINLEVEL_OUTOF10: 0 - NO PAIN
PAINLEVEL_OUTOF10: 2
PAINLEVEL_OUTOF10: 0 - NO PAIN
PAINLEVEL_OUTOF10: 2
PAINLEVEL_OUTOF10: 0 - NO PAIN

## 2025-03-20 ASSESSMENT — ENCOUNTER SYMPTOMS
CARDIOVASCULAR NEGATIVE: 1
HEMATOLOGIC/LYMPHATIC NEGATIVE: 1
PSYCHIATRIC NEGATIVE: 1
NEUROLOGICAL NEGATIVE: 1
GASTROINTESTINAL NEGATIVE: 1
RESPIRATORY NEGATIVE: 1
ALLERGIC/IMMUNOLOGIC NEGATIVE: 1
MUSCULOSKELETAL NEGATIVE: 1
ENDOCRINE NEGATIVE: 1
EYES NEGATIVE: 1
CONSTITUTIONAL NEGATIVE: 1

## 2025-03-20 ASSESSMENT — PAIN DESCRIPTION - DESCRIPTORS
DESCRIPTORS: DULL;ACHING
DESCRIPTORS: DISCOMFORT;DULL
DESCRIPTORS: DULL;DISCOMFORT

## 2025-03-20 NOTE — ANESTHESIA PREPROCEDURE EVALUATION
Patient: Mireya Ramsey    Procedure Information       Date/Time: 03/20/25 1050    Procedures:       MASTECTOMY, PARTIAL (Left) - Magseed 2/27, SNI @ 10AM      BIOPSY, LYMPH NODE, AXILLARY (Left) - SLN biopsy poss axillary dissection    Location: [facility] / [facility]    Surgeons: Any Lopez MD            Relevant Problems   Cardiac   (+) HTN (hypertension)   (+) Hyperlipidemia      GI   (+) Gastroesophageal reflux disease      Endocrine   (+) Obesity      Musculoskeletal   (+) Osteoarthritis      HEENT   (+) Hearing loss      GYN   (+) Malignant neoplasm of left female breast       Clinical information reviewed:   Tobacco  Allergies  Meds   Med Hx  Surg Hx   Fam Hx  Soc Hx        NPO Detail:  No data recorded     Physical Exam    Airway  Mallampati: III  TM distance: >3 FB  Neck ROM: full     Cardiovascular   Rhythm: regular  Rate: normal     Dental - normal exam     Pulmonary - normal exam     Abdominal   (+) obese             Anesthesia Plan    History of general anesthesia?: yes  History of complications of general anesthesia?: no    ASA 3     general     The patient is not a current smoker.  Education provided regarding risk of obstructive sleep apnea.  intravenous induction   Anesthetic plan and risks discussed with patient.

## 2025-03-20 NOTE — ANESTHESIA POSTPROCEDURE EVALUATION
Patient: Mireya Ramsey    Procedure Summary       Date: 03/20/25 Room / Location: [facility]    Anesthesia Start: 1045 Anesthesia Stop: 1239    Procedures:       MASTECTOMY, PARTIAL (Left)      BIOPSY, LYMPH NODE, AXILLARY (Left) Diagnosis:       Malignant neoplasm of left female breast, unspecified estrogen receptor status, unspecified site of breast      (Malignant neoplasm of left female breast, unspecified estrogen receptor status, unspecified site of breast [C50.912])    Surgeons: Any Lopez MD Responsible Provider: Clover Walters MD    Anesthesia Type: general ASA Status: 3            Anesthesia Type: general    Vitals Value Taken Time   /74 03/20/25 1243   Temp 36.0 03/20/25 1243   Pulse 74 03/20/25 1242   Resp 14 03/20/25 1242   SpO2 100 % 03/20/25 1242   Vitals shown include unfiled device data.    Anesthesia Post Evaluation    Patient location during evaluation: PACU  Patient participation: complete - patient participated  Level of consciousness: sleepy but conscious  Pain score: 0  Pain management: adequate  Multimodal analgesia pain management approach  Airway patency: patent  Two or more strategies used to mitigate risk of obstructive sleep apnea  Cardiovascular status: acceptable  Respiratory status: acceptable  Hydration status: acceptable  Postoperative Nausea and Vomiting: none        No notable events documented.

## 2025-03-20 NOTE — H&P
History Of Present Illness  Mireya Ramsey is a 59 y.o. female presenting with left breast cancer.     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    Peripheral edema     bilateral ankles    Wears glasses        Surgical History  Past Surgical History:   Procedure Laterality Date    ADENOIDECTOMY      BREAST BIOPSY Left     BREAST CYST EXCISION      COLON SURGERY      colon resection for diverticulosis, no colostomy    COLONOSCOPY      HERNIA REPAIR      abdominal incisional hernia repair    INNER EAR SURGERY Right     attempted to correct hearing loss    TONSILLECTOMY          Social History  She reports that she has never smoked. She has never used smokeless tobacco. She reports current alcohol use. She reports that she does not currently use drugs after having used the following drugs: Marijuana.    Family History  Family History   Problem Relation Name Age of Onset    Hodgkin's lymphoma Father          Allergies  Patient has no known allergies.    Review of Systems   Constitutional: Negative.    HENT: Negative.     Eyes: Negative.    Respiratory: Negative.     Cardiovascular: Negative.    Gastrointestinal: Negative.    Endocrine: Negative.    Genitourinary: Negative.    Musculoskeletal: Negative.    Skin: Negative.    Allergic/Immunologic: Negative.    Neurological: Negative.    Hematological: Negative.    Psychiatric/Behavioral: Negative.          Physical Exam  Constitutional:       General: She is not in acute distress.     Appearance: Normal appearance.   HENT:      Head: Normocephalic and atraumatic.      Mouth/Throat:      Mouth: Mucous membranes are moist.   Eyes:      Pupils: Pupils are equal, round, and reactive to  "light.   Cardiovascular:      Rate and Rhythm: Normal rate and regular rhythm.   Pulmonary:      Effort: Pulmonary effort is normal.   Chest:      Chest wall: No mass, swelling or tenderness.   Breasts:     Right: Normal. No mass, nipple discharge, skin change or tenderness.      Left: Normal. No mass, nipple discharge, skin change or tenderness.   Abdominal:      General: Abdomen is flat.      Palpations: Abdomen is soft.   Musculoskeletal:         General: Normal range of motion.      Cervical back: Normal range of motion.   Lymphadenopathy:      Cervical: No cervical adenopathy.      Upper Body:      Right upper body: No axillary adenopathy.      Left upper body: No axillary adenopathy.   Skin:     General: Skin is warm and dry.   Neurological:      Mental Status: She is alert. Mental status is at baseline.   Psychiatric:         Mood and Affect: Mood normal.          Last Recorded Vitals  Height 1.626 m (5' 4\"), weight 104 kg (230 lb).    Relevant Results             Assessment/Plan   Assessment & Plan  Malignant neoplasm of left female breast    HTN (hypertension)    Hyperlipidemia    Gastroesophageal reflux disease    Hearing loss    Osteoarthritis    Obesity      Left partial mastectomy with sentinel lymph node biopsy possible axillary node dissection       I spent  minutes in the professional and overall care of this patient.      Any Lopez MD    "

## 2025-03-20 NOTE — OP NOTE
MASTECTOMY, PARTIAL (L) Operative Note     Date: 3/20/2025  OR Location: [facility]    Name: Mireya Ramsey, : 1965, Age: 59 y.o., MRN: [id number], Sex: female    Diagnosis  Pre-op Diagnosis      * Malignant neoplasm of left female breast, unspecified estrogen receptor status, unspecified site of breast [C50.912] Post-op Diagnosis     * Malignant neoplasm of left female breast, unspecified estrogen receptor status, unspecified site of breast [C50.912]     Procedures  MASTECTOMY, PARTIAL  55517 - NV MASTECTOMY PARTIAL    BIOPSY, LYMPH NODE, AXILLARY  02168 - NV BX/EXC LYMPH NODE OPEN DEEP AXILLARY NODE      Surgeons   Panel 1:     * Any Lopez - Primary  Panel 2:     * Any Lopez - Primary    Resident/Fellow/Other Assistant:  Surgeons and Role:  * No surgeons found with a matching role *    Staff:   Scrub Person: Casey  Circulator: Mary  Surgical Assistant: Shayan Rizo Person: Cora  Relief Circulator: Colt    Anesthesia Staff: Anesthesiologist: Clover Walters MD    Procedure Summary  Anesthesia: General  ASA: III  Estimated Blood Loss: 0mL  Intra-op Medications:   Administrations occurring from 1050 to 1305 on 25:   Medication Name Total Dose   ceFAZolin (Ancef) vial 1 g 2 g   dexAMETHasone (Decadron) 4 mg/mL 4 mg   fentaNYL PF 0.05 mg/mL 50 mcg   ondansetron 2 mg/mL 4 mg              Anesthesia Record               Intraprocedure I/O Totals       None           Specimen:   ID Type Source Tests Collected by Time   1 : SENTINAL LYMPH NODE #1 LEFT AXILLARY Tissue SENTINEL LYMPH NODE BREAST LEFT SURGICAL PATHOLOGY EXAM Any Lopez MD 3/20/2025 1129   2 : SENTINAL LYMPH NODE #2 LEFT AXILLARY Tissue SENTINEL LYMPH NODE BREAST LEFT SURGICAL PATHOLOGY EXAM Any Lopez MD 3/20/2025 1133   3 : SENTINAL LYMPH NODE #3 lEFT AXILLARY Tissue SENTINEL LYMPH NODE BREAST LEFT SURGICAL PATHOLOGY EXAM Any Lopez MD 3/20/2025 1135   4 : LEFT BREAST MASS, GREEN ANTERIOR, BLUE INFERIOR,  ORANGE LATERAL, YELLOW MEDIAL, BLACK POSTERIOR, RED SUPERIOR Tissue BREAST MASTECTOMY LEFT SURGICAL PATHOLOGY EXAM Any Lopez MD 3/20/2025 1155   5 : LEFT BREAST SUPERIOR MARGIN, CLIP SCHAEFER TRUE MARGIN Tissue BREAST MARGIN LEFT SURGICAL PATHOLOGY EXAM Any Lopez MD 3/20/2025 1202   6 : LEFT BREAST ANTERIOR MARGIN, CLIP SCHAEFER TRUE MARGIN Tissue BREAST MARGIN LEFT SURGICAL PATHOLOGY EXAM Any Lopez MD 3/20/2025 1203   7 : LEFT BREAST MEDIAL MARGIN, CLIP SCHAEFER TRUE MARGIN Tissue BREAST MARGIN LEFT SURGICAL PATHOLOGY EXAM Any Lopez MD 3/20/2025 1203   8 : LEFT BREAST LATERAL MARGIN, CLIP SCHAEFER TRUE MARGIN Tissue BREAST MARGIN LEFT SURGICAL PATHOLOGY EXAM Any Lopez MD 3/20/2025 1203   9 : LEFT BREAST INFERIOR MARGIN, CLIP SCHAEFER TRUE MARGIN Tissue BREAST MARGIN LEFT SURGICAL PATHOLOGY EXAM Any Lopez MD 3/20/2025 1204                 Drains and/or Catheters: * None in log *    Tourniquet Times:         Implants:     Findings: See report    Indications: Mireya Ramsey is an 59 y.o. female who is having surgery for Malignant neoplasm of left female breast, unspecified estrogen receptor status, unspecified site of breast [C50.912].     The patient was seen in the preoperative area. The risks, benefits, complications, treatment options, non-operative alternatives, expected recovery and outcomes were discussed with the patient. The possibilities of reaction to medication, pulmonary aspiration, injury to surrounding structures, bleeding, recurrent infection, the need for additional procedures, failure to diagnose a condition, and creating a complication requiring transfusion or operation were discussed with the patient. The patient concurred with the proposed plan, giving informed consent.  The site of surgery was properly noted/marked if necessary per policy. The patient has been actively warmed in preoperative area. Preoperative antibiotics given.  Venous thrombosis prophylaxis  provided.    Procedure Details: After satisfactory induction of anesthesia, under sterile conditions 5 cc methylene blue injected in a subareolar manner in the left breast and massaged.  Patient was prepped and draped.  Attention turned to the left  axilla.  Utilizing the neoprobe device, region of the sentinel node was localized to the lower axilla.  Local anesthesia was infiltrated.  Incision was made along the inferior border of the hairbearing region of the axilla and dissection carried down into the immediate subcutaneous space.  Axillary fat was exposed and careful dissection revealed 3 sentinel lymph nodes.  First lymph node excised without blue uptake in the node or lymphatic but with ex vivo count of 800.  Further dissection revealed 2 bright blue lymph nodes with ex vivo counts of 8000 and 800 respectively.  These did not appear pathologic on palpation.  Palpation revealed no other concerning areas within the axilla.  No further blue nodes or blue lymphatics were identified in the course of the dissection.  Residual bed count was 100.  Hemostasis sure with focal cautery.  The wound was irrigated and closed in layers with 3-0 Vicryl suture deeply and 4-0 Monocryl.  Attention then turned to the left breast.  Utilizing the Sentimag device, region of the known cancer was localized to the superior aspect of the left breast.  Curvilinear incision was made adjacent to the areola and dissection carried down into the subcutaneous tissues.  Keeping the Magseed centralized, circumferential excision down to the chest wall was accomplished into normal surrounding breast tissue.  Ex vivo confirmation of the Magseed clip was noted.  Specimen radiograph confirmed central positioning of the Magseed and biopsy clip.  Specimen oriented for pathology utilizing the margin marker technique.  Hemostasis again assured with focal cautery.  Shave margins obtained sharply from the superior, inferior, medial, lateral, anterior  locations.  Dissection carried down to the chest wall and no further posterior margin was obtained.  Hemostasis again assured.  Clips used to marin the lumpectomy bed for consideration of adjuvant radiation.  Incision closed in an identical manner.  Dressings applied.  Patient awakened anesthesia and transferred to recovery in satisfactory condition, tolerated well.  Complications:  None; patient tolerated the procedure well.    Disposition: PACU - hemodynamically stable.  Condition: stable         Task Performed by RNFA or Surgical Assistant:  Retraction and exposure          Additional Details:     Attending Attestation:     Any Lopez  Phone Number: [phone]

## 2025-03-20 NOTE — ANESTHESIA PROCEDURE NOTES
Airway  Date/Time: 3/20/2025 10:50 AM  Urgency: elective    Airway not difficult    Staffing  Performed: attending   Authorized by: Clover Walters MD    Performed by: Clover Walters MD  Patient location during procedure: OR    Indications and Patient Condition  Indications for airway management: anesthesia and airway protection  Spontaneous ventilation: present  Sedation level: deep  Preoxygenated: yes  Patient position: sniffing  Mask difficulty assessment: 1 - vent by mask    Final Airway Details  Final airway type: endotracheal airway      Successful airway: ETT  Cuffed: yes   Successful intubation technique: direct laryngoscopy  Facilitating devices/methods: intubating stylet  Endotracheal tube insertion site: oral  Blade: Dev  Blade size: #3  ETT size (mm): 7.0  Cormack-Lehane Classification: grade IIb - view of arytenoids or posterior of glottis only  Placement verified by: chest auscultation and capnometry   Measured from: teeth  ETT to teeth (cm): 22  Number of attempts at approach: 1

## 2025-03-20 NOTE — DISCHARGE INSTRUCTIONS
Activity as tolerated  Sports bra as needed for comfort  Okay to drive once off pain medicine  Okay to shower, Steri-Strips x 1 week    General Anesthesia Discharge Instructions    About this topic  You may need general anesthesia if you need to be asleep during a procedure. Your doctor will use drugs to block the signals that go from your nerves to your brain. Doctors give general anesthesia during a surgery or procedure to:  Allow you to sleep  Help your body be still  Relax your muscles  Help you to relax and be pain free  Keep you from remembering the surgery  Let the doctor manage your airway, breathing, and blood flow  The doctor or nurse anesthetist gives general anesthesia by a shot into your vein. Sometimes, you may breathe in a gas through a mask placed over your face.  What care is needed at home?  Ask your doctor what you need to do when you go home. Make sure you ask questions if you do not understand what the doctor says.  Your doctor may give you drugs to prevent or treat an upset stomach from the anesthetic. Take them as ordered.  If your throat is sore, suck on ice chips or popsicles to ease throat pain.  Put 2 to 3 pillows under your head and back when you lie down to help you breathe easier.  For the first 24 to 48 hours:  Do not operate heavy or dangerous machinery.  Do not make major decisions or sign important papers. You may not be able to think clearly.  Avoid beer, wine, or mixed drinks.  You are at a higher risk of falling for at least 24 hours after general anesthesia.  Take extra care when you get up.  Do not change positions quickly.  Do not rush when you need to go to the bathroom or to answer the phone.  Ask for help if you feel unsteady when you try to walk.  Wear shoes with non-slip soles and low heels.  What follow-up care is needed?  Your doctor may ask you to come back to the office to check on your progress. Be sure to keep these visits.  If you have stitches that do not dissolve  or staples, you will need to have them removed. Your doctor will want to do this in 1 to 2 weeks. If the doctor used skin glue, the glue will fall off on its own.  What drugs may be needed?  The doctor may order drugs to:  Help with pain  Treat an upset stomach or throwing up  Will physical activity be limited?  You will not be allowed to drive right away after the procedure. Ask a family member or a friend to drive you home.  Avoid trying to get out of bed without help until you are sure of your balance.  You may have to limit your activity. Talk to your doctor about if you need to limit how much you lift or limit exercise after your procedure.  What changes to diet are needed?  Start with a light diet when you are fully awake. This includes things that are easy to swallow like soups, pudding, jello, toast, and eggs. Slowly progress to your normal diet.  What problems could happen?  Low blood pressure  Breathing problems  Upset stomach or throwing up  Dizziness  Blood clots  Infection  When do I need to call the doctor?  Trouble breathing  Upset stomach or throwing up more than 3 times in the next 2 days  Dizziness  Teach Back: Helping You Understand  The Teach Back Method helps you understand the information we are giving you. After you talk with the staff, tell them in your own words what you learned. This helps to make sure the staff has described each thing clearly. It also helps to explain things that may have been confusing. Before going home, make sure you can do these:  I can tell you about my procedure.  I can tell you if I need to follow up with my doctor.  I can tell you what is good for me to eat and drink the next day.  I can tell you what I would do if I have trouble breathing, an upset stomach, or dizziness.  Where can I learn more?  [employer]  https://www.nigms.nih.gov/education/pages/factsheet_Anesthesia.aspx  [facility]  Choices  http://www.nhs.uk/conditions/Anaesthetic-general/Pages/Definition.aspx  Last Reviewed Date  2020-04-22

## 2025-04-03 ASSESSMENT — ENCOUNTER SYMPTOMS
ENDOCRINE NEGATIVE: 1
GASTROINTESTINAL NEGATIVE: 1
HEMATOLOGIC/LYMPHATIC NEGATIVE: 1
CONSTITUTIONAL NEGATIVE: 1
EYES NEGATIVE: 1
NEUROLOGICAL NEGATIVE: 1
ALLERGIC/IMMUNOLOGIC NEGATIVE: 1
RESPIRATORY NEGATIVE: 1
PSYCHIATRIC NEGATIVE: 1
MUSCULOSKELETAL NEGATIVE: 1
CARDIOVASCULAR NEGATIVE: 1

## 2025-04-03 NOTE — PROGRESS NOTES
Subjective   Patient ID: Mireya Ramsey is a 59 y.o. female who presents for No chief complaint on file..  Abnormal mammogram  HPI  Status post ultrasound-guided core biopsy of the left breast mass, invasive ductal carcinoma grade 2 ER greater than 95% [place] negative, HER2 negative    Preliminary staging cT1 N0    Patient presents for abnormal breast imaging.  2025: Bilateral diagnostic mammogram with left breast ultrasound is reviewed: Right breast unremarkable.  Mammogram of the LEFT breast at the 12 to 1 o'clock position posterior depth, 6 cm from the nipple with an irregular mass with microlobulated slightly spiculated margins 9 x 6 x 7 mm.  Subsequent ultrasound reconfirms this irregular hypoechoic mass with indistinct margins 8 x 6 x 6 mm in the 12 o'clock position 6 cm from the nipple.  Unremarkable axilla.  Category 4    3/20/2025: Status post Left partial mastectomy with sentinel lymph node biopsy  Pathology pending    No breast complaints. Some residual soreness at surgical sites and tightness/hypersensitivity underneath L arm.    Breast history:  , age 30, no BF  Prior history of skin  cyst excision left lateral breast.  No family history  Menarche 8, menopause approx 48, amenorrheic after Depo use discontinued    Review of Systems   Constitutional: Negative.    HENT: Negative.     Eyes: Negative.    Respiratory: Negative.     Cardiovascular: Negative.    Gastrointestinal: Negative.    Endocrine: Negative.    Genitourinary: Negative.    Musculoskeletal: Negative.    Skin: Negative.    Allergic/Immunologic: Negative.    Neurological: Negative.    Hematological: Negative.    Psychiatric/Behavioral: Negative.           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    Peripheral edema     bilateral ankles    Wears glasses      Past Surgical History:   Procedure Laterality Date    ADENOIDECTOMY      BREAST BIOPSY Left     BREAST CYST EXCISION      COLON SURGERY      colon resection for diverticulosis, no colostomy    COLONOSCOPY      HERNIA REPAIR      abdominal incisional hernia repair    INNER EAR SURGERY Right     attempted to correct hearing loss    TONSILLECTOMY       Family History   Problem Relation Name Age of Onset    Hodgkin's lymphoma Father        Social History     Socioeconomic History    Marital status:    Tobacco Use    Smoking status: Never    Smokeless tobacco: Never   Vaping Use    Vaping status: Never Used   Substance and Sexual Activity    Alcohol use: Yes     Comment: Occasionally \"maybe one beer every couple of weeks\"    Drug use: Not Currently     Types: Marijuana     Comment: smokes it for sleeping aid. has not used in weeks.    Sexual activity: Defer     Social Drivers of Health     Financial Resource Strain: Low Risk  (10/29/2024)    Received from [facility]    Overall Financial Resource Strain (CARDIA)     Difficulty of Paying Living Expenses: Not hard at all   Food Insecurity: No Food Insecurity (10/29/2024)    Received from [facility]    Hunger Vital Sign     Worried About Running Out of Food in the Last Year: Never true     Ran Out of Food in the Last Year: Never true   Transportation Needs: Unknown (10/29/2024)    Received from [facility]    PRAPARE - Transportation     Lack of Transportation (Non-Medical): No   Housing Stability: Unknown (10/29/2024)    Received from [facility]    Housing Stability Vital Sign     Homeless in the Last Year: No          Current Outpatient Medications:     calcium carbonate-vitamin D3 (Calcium 600 + D,3,) 600 mg-10 mcg (400 unit) tablet, Take 1 tablet by mouth once daily., Disp: , Rfl: "     cranberry/vitamin C/d-mannose (CRANBERRY-VITAMIN C-MANNOSE ORAL), Take 1 capsule by mouth once daily., Disp: , Rfl:     fenofibrate micronized (Lofibra) 134 mg capsule, Take 1 capsule (134 mg) by mouth once daily., Disp: , Rfl:     ibuprofen 800 mg tablet, Take 1 tablet (800 mg) by mouth every 8 hours if needed for mild pain (1 - 3)., Disp: , Rfl:     omega-3s-dha-epa-fish oil (Omega-3 Fish OiL) 300-1,000 mg capsule, Take 1 capsule (300 mg) by mouth 2 times a day., Disp: , Rfl:     omeprazole (PriLOSEC) 40 mg DR capsule, Take 1 capsule (40 mg) by mouth once daily in the morning. Take before meals. Do not crush or chew., Disp: , Rfl:     potassium chloride (Klor-Con) 20 mEq packet, Take 20 mEq by mouth once daily., Disp: , Rfl:     rosuvastatin (Crestor) 10 mg tablet, Take 1 tablet (10 mg) by mouth once daily., Disp: , Rfl:     torsemide (Demadex) 20 mg tablet, Take 1 tablet (20 mg) by mouth once daily., Disp: , Rfl:      Objective   There were no vitals filed for this visit.     Physical Exam  Constitutional:       General: She is not in acute distress.     Appearance: Normal appearance.   HENT:      Head: Normocephalic and atraumatic.      Mouth/Throat:      Mouth: Mucous membranes are moist.   Eyes:      Pupils: Pupils are equal, round, and reactive to light.   Cardiovascular:      Rate and Rhythm: Normal rate and regular rhythm.   Pulmonary:      Effort: Pulmonary effort is normal.   Chest:      Chest wall: No mass, swelling or tenderness.   Breasts:     Right: Normal. No mass, nipple discharge, skin change or tenderness.      Left: Normal. No mass, nipple discharge, skin change or tenderness.      Comments: Incisions well-healed without cellulitis or seroma. Full shoulder ROM  Abdominal:      General: Abdomen is flat.      Palpations: Abdomen is soft.   Musculoskeletal:         General: Normal range of motion.      Cervical back: Normal range of motion.   Lymphadenopathy:      Cervical: No cervical  adenopathy.      Upper Body:      Right upper body: No axillary adenopathy.      Left upper body: No axillary adenopathy.   Skin:     General: Skin is warm and dry.   Neurological:      Mental Status: She is alert. Mental status is at baseline.   Psychiatric:         Mood and Affect: Mood normal.           Assessment/Plan   Problem List Items Addressed This Visit    None      Awaiting final pathology. Barring need for further surgery, plan referral to medical/radiation oncology. Three month followup with me for routine oncologic surveillance. Reassured regarding postoperative course.      Any Lopez MD

## 2025-04-04 ENCOUNTER — OFFICE VISIT (OUTPATIENT)
Dept: SURGERY | Facility: HOSPITAL | Age: 60
End: 2025-04-04
Payer: COMMERCIAL

## 2025-04-04 DIAGNOSIS — C50.912 MALIGNANT NEOPLASM OF LEFT FEMALE BREAST, UNSPECIFIED ESTROGEN RECEPTOR STATUS, UNSPECIFIED SITE OF BREAST: Primary | ICD-10-CM

## 2025-04-04 PROCEDURE — 99211 OFF/OP EST MAY X REQ PHY/QHP: CPT | Performed by: SURGERY

## 2025-04-10 LAB
LAB AP ASR DISCLAIMER: NORMAL
LAB AP BLOCK FOR ADDITIONAL STUDIES: NORMAL
LABORATORY COMMENT REPORT: NORMAL
Lab: NORMAL
PATH REPORT.COMMENTS IMP SPEC: NORMAL
PATH REPORT.FINAL DX SPEC: NORMAL
PATH REPORT.GROSS SPEC: NORMAL
PATH REPORT.RELEVANT HX SPEC: NORMAL
PATH REPORT.TOTAL CANCER: NORMAL
PATHOLOGY SYNOPTIC REPORT: NORMAL

## 2025-04-10 PROCEDURE — 88342 IMHCHEM/IMCYTCHM 1ST ANTB: CPT | Performed by: STUDENT IN AN ORGANIZED HEALTH CARE EDUCATION/TRAINING PROGRAM

## 2025-04-10 PROCEDURE — 88341 IMHCHEM/IMCYTCHM EA ADD ANTB: CPT | Performed by: STUDENT IN AN ORGANIZED HEALTH CARE EDUCATION/TRAINING PROGRAM

## 2025-04-29 DIAGNOSIS — C50.912 MALIGNANT NEOPLASM OF LEFT FEMALE BREAST, UNSPECIFIED ESTROGEN RECEPTOR STATUS, UNSPECIFIED SITE OF BREAST: Primary | ICD-10-CM

## 2025-05-01 ENCOUNTER — TUMOR BOARD CONFERENCE (OUTPATIENT)
Dept: HEMATOLOGY/ONCOLOGY | Facility: HOSPITAL | Age: 60
End: 2025-05-01
Payer: COMMERCIAL

## 2025-05-01 NOTE — TUMOR BOARD NOTE
MULTIDISCIPLINARY BREAST CANCER TUMOR BOARD CONFERENCE NOTE  Mireya Ramsey was presented at Breast Cancer Tumor Board Conference  Conference date: 5/1/2025  Presenting Provider(s): Dr. Lopez  Present at Conference: Medical Oncology, Radiation Oncology, Surgical Oncology, Radiology, and Pathology Representatives  Conference Review Type: Pathology Review    National Guidelines discussed: Yes    Surgical Resection: Surgery is complete.  Radiation therapy:  Consider  Genomic Testing: yes OncotypeDx   Systemic therapy:  Consider treatment based on OncotypeDx   Clinical Trial Eligible: yes eligible [id number] and [place] 2 based on OncotypeDx   Genetics:     Referral Recommendations:      Cancer Staging:  Cancer Staging   No matching staging information was found for the patient.       Disclaimer  SCC tumor board recommendations represent the consensus opinion of physicians present at a weekly patient care conference. The treating SCC physician is not always present, and many of the physicians formulating the recommendation have not personally seen or examined the patient under discussion. It is understood that the treating SCC physician considers the expertise of the Tumor Board Recommendation in formulating his/her plan for the patient. However, in many situations, based on individualized patient considerations, a different plan is determined by the treating physician to be the optimal medical management.    Scribe Attestation  By signing my name below, Zoe VENTURA Scribe   attest that this documentation has been prepared under the direction and in the presence of BREAST TUMOR BOARD.

## 2025-05-23 ENCOUNTER — HOSPITAL ENCOUNTER (OUTPATIENT)
Dept: CARDIOLOGY | Facility: HOSPITAL | Age: 60
Discharge: HOME | End: 2025-05-23
Payer: COMMERCIAL

## 2025-05-23 ENCOUNTER — HOSPITAL ENCOUNTER (OUTPATIENT)
Dept: RADIOLOGY | Facility: HOSPITAL | Age: 60
Discharge: HOME | End: 2025-05-23
Payer: COMMERCIAL

## 2025-05-23 DIAGNOSIS — I10 ESSENTIAL (PRIMARY) HYPERTENSION: ICD-10-CM

## 2025-05-23 LAB
ATRIAL RATE: 71 BPM
P AXIS: 45 DEGREES
P OFFSET: 215 MS
P ONSET: 153 MS
PR INTERVAL: 142 MS
Q ONSET: 224 MS
QRS COUNT: 12 BEATS
QRS DURATION: 92 MS
QT INTERVAL: 414 MS
QTC CALCULATION(BAZETT): 449 MS
QTC FREDERICIA: 438 MS
R AXIS: -19 DEGREES
T AXIS: 16 DEGREES
T OFFSET: 431 MS
VENTRICULAR RATE: 71 BPM

## 2025-05-23 PROCEDURE — 93005 ELECTROCARDIOGRAM TRACING: CPT

## 2025-05-23 PROCEDURE — 71046 X-RAY EXAM CHEST 2 VIEWS: CPT

## 2025-07-11 ENCOUNTER — OFFICE VISIT (OUTPATIENT)
Dept: SURGERY | Facility: HOSPITAL | Age: 60
End: 2025-07-11
Payer: COMMERCIAL

## 2025-07-11 DIAGNOSIS — C50.912 MALIGNANT NEOPLASM OF LEFT FEMALE BREAST, UNSPECIFIED ESTROGEN RECEPTOR STATUS, UNSPECIFIED SITE OF BREAST: Primary | ICD-10-CM

## 2025-07-11 PROCEDURE — 99213 OFFICE O/P EST LOW 20 MIN: CPT | Performed by: SURGERY

## 2025-07-11 RX ORDER — AMLODIPINE BESYLATE 5 MG/1
5 TABLET ORAL
COMMUNITY
Start: 2025-05-19

## 2025-07-11 ASSESSMENT — ENCOUNTER SYMPTOMS
ENDOCRINE NEGATIVE: 1
GASTROINTESTINAL NEGATIVE: 1
CONSTITUTIONAL NEGATIVE: 1
HEMATOLOGIC/LYMPHATIC NEGATIVE: 1
CARDIOVASCULAR NEGATIVE: 1
EYES NEGATIVE: 1
ALLERGIC/IMMUNOLOGIC NEGATIVE: 1
PSYCHIATRIC NEGATIVE: 1
NEUROLOGICAL NEGATIVE: 1
RESPIRATORY NEGATIVE: 1
MUSCULOSKELETAL NEGATIVE: 1

## 2025-07-11 NOTE — PROGRESS NOTES
Subjective   Patient ID: Mireya Ramsey is a 59 y.o. female who presents for Follow-up (3 month follow up; no imaging).  Abnormal mammogram  HPI  Status post ultrasound-guided core biopsy of the left breast mass, invasive ductal carcinoma grade 2 ER greater than 95% [place] negative, HER2 negative    Preliminary staging cT1 N0    Patient presents for abnormal breast imaging.  2025: Bilateral diagnostic mammogram with left breast ultrasound is reviewed: Right breast unremarkable.  Mammogram of the LEFT breast at the 12 to 1 o'clock position posterior depth, 6 cm from the nipple with an irregular mass with microlobulated slightly spiculated margins 9 x 6 x 7 mm.  Subsequent ultrasound reconfirms this irregular hypoechoic mass with indistinct margins 8 x 6 x 6 mm in the 12 o'clock position 6 cm from the nipple.  Unremarkable axilla.  Category 4    3/20/2025: Status post Left partial mastectomy with sentinel lymph node biopsy  Pathology     No breast complaints. Some residual soreness at surgical sites and tightness/hypersensitivity underneath L arm.    Breast history:  , age 30, no BF  Prior history of skin  cyst excision left lateral breast.  No family history  Menarche 8, menopause approx 48, amenorrheic after Depo use discontinued    Review of Systems   Constitutional: Negative.    HENT: Negative.     Eyes: Negative.    Respiratory: Negative.     Cardiovascular: Negative.    Gastrointestinal: Negative.    Endocrine: Negative.    Genitourinary: Negative.    Musculoskeletal: Negative.    Skin: Negative.    Allergic/Immunologic: Negative.    Neurological: Negative.    Hematological: Negative.    Psychiatric/Behavioral: Negative.           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   • Peripheral edema     bilateral ankles   • Wears glasses      Past Surgical History:   Procedure Laterality Date   • ADENOIDECTOMY     • BREAST BIOPSY Left 2-12-25   • BREAST CYST EXCISION  2018   • COLON SURGERY  2006    colon resection for diverticulosis, no colostomy   • COLONOSCOPY     • HERNIA REPAIR  2007    abdominal incisional hernia repair   • INNER EAR SURGERY Right     attempted to correct hearing loss   • TONSILLECTOMY       Family History   Problem Relation Name Age of Onset   • Hodgkin's lymphoma Father     • Colon cancer Paternal Grandmother Luisa    • Colon cancer Paternal Grandmother Luisa    • Colon cancer Paternal Grandmother Luisa       Social History     Socioeconomic History   • Marital status:    Tobacco Use   • Smoking status: Never   • Smokeless tobacco: Never   Vaping Use   • Vaping status: Never Used   Substance and Sexual Activity   • Alcohol use: Yes     Alcohol/week: 1.0 standard drink of alcohol     Types: 1 Cans of beer per week     Comment: Occasionally \"maybe one beer every couple of weeks\"   • Drug use: Not Currently     Frequency: 3.0 times per week     Types: Marijuana     Comment: smokes it for sleeping aid. has not used in weeks.   • Sexual activity: Yes     Partners: Male     Birth control/protection: Post-menopausal     Social Drivers of Health     Financial Resource Strain: Low Risk  (10/29/2024)    Received from [facility]    Overall Financial Resource Strain (CARDIA)    • Difficulty of Paying Living Expenses: Not hard at all   Food Insecurity: No Food Insecurity (10/29/2024)    Received from [facility]    Hunger Vital Sign    • Worried About Running Out of Food in the Last Year: Never true    • Ran Out of Food in the Last Year: Never true   Transportation Needs: Unknown (10/29/2024)    Received from [facility]    PRAPARE - Transportation    • Lack of Transportation (Non-Medical): No   Housing Stability: Unknown (10/29/2024)    [facility] from [facility]    Housing Stability Vital Sign    • Homeless in the Last Year: No          Current Outpatient Medications:   •  amLODIPine (Norvasc) 5 mg tablet, 1 tablet (5 mg)., Disp: , Rfl:   •  calcium carbonate-vitamin D3 (Calcium 600 + D,3,) 600 mg-10 mcg (400 unit) tablet, Take 1 tablet by mouth once daily., Disp: , Rfl:   •  cranberry/vitamin C/d-mannose (CRANBERRY-VITAMIN C-MANNOSE ORAL), Take 1 capsule by mouth once daily., Disp: , Rfl:   •  fenofibrate micronized (Lofibra) 134 mg capsule, Take 1 capsule (134 mg) by mouth once daily., Disp: , Rfl:   •  ibuprofen 800 mg tablet, Take 1 tablet (800 mg) by mouth every 8 hours if needed for mild pain (1 - 3)., Disp: , Rfl:   •  omega-3s-dha-epa-fish oil (Omega-3 Fish OiL) 300-1,000 mg capsule, Take 1 capsule (300 mg) by mouth 2 times a day., Disp: , Rfl:   •  omeprazole (PriLOSEC) 40 mg DR capsule, Take 1 capsule (40 mg) by mouth once daily in the morning. Take before meals. Do not crush or chew., Disp: , Rfl:   •  potassium chloride (Klor-Con) 20 mEq packet, Take 20 mEq by mouth once daily., Disp: , Rfl:   •  rosuvastatin (Crestor) 10 mg tablet, Take 1 tablet (10 mg) by mouth once daily., Disp: , Rfl:   •  torsemide (Demadex) 20 mg tablet, Take 1 tablet (20 mg) by mouth once daily., Disp: , Rfl:      Objective   There were no vitals filed for this visit.     Physical Exam  Constitutional:       General: She is not in acute distress.     Appearance: Normal appearance.   HENT:      Head: Normocephalic and atraumatic.      Mouth/Throat:      Mouth: Mucous membranes are moist.   Eyes:      Pupils: Pupils are equal, round, and reactive to light.   Cardiovascular:      Rate and Rhythm: Normal rate and regular rhythm.   Pulmonary:      Effort: Pulmonary effort is normal.   Chest:      Chest wall: No  mass, swelling or tenderness.   Breasts:     Right: Normal. No mass, nipple discharge, skin change or tenderness.      Left: Normal. No mass, nipple discharge, skin change or tenderness.      Comments: Incisions well-healed without cellulitis or seroma. Full shoulder ROM  Abdominal:      General: Abdomen is flat.      Palpations: Abdomen is soft.   Musculoskeletal:         General: Normal range of motion.      Cervical back: Normal range of motion.   Lymphadenopathy:      Cervical: No cervical adenopathy.      Upper Body:      Right upper body: No axillary adenopathy.      Left upper body: No axillary adenopathy.   Skin:     General: Skin is warm and dry.   Neurological:      Mental Status: She is alert. Mental status is at baseline.   Psychiatric:         Mood and Affect: Mood normal.         Assessment/Plan   Problem List Items Addressed This Visit    None      Awaiting final pathology. Barring need for further surgery, plan referral to medical/radiation oncology. Three month followup with me for routine oncologic surveillance. Reassured regarding postoperative course.      Any Lopez MD

## 2025-07-17 ENCOUNTER — APPOINTMENT (OUTPATIENT)
Dept: OBSTETRICS AND GYNECOLOGY | Facility: CLINIC | Age: 60
End: 2025-07-17
Payer: COMMERCIAL

## 2025-07-28 DIAGNOSIS — Z17.0 MALIGNANT NEOPLASM OF CENTRAL PORTION OF LEFT BREAST IN FEMALE, ESTROGEN RECEPTOR POSITIVE: Primary | ICD-10-CM

## 2025-07-28 DIAGNOSIS — C50.112 MALIGNANT NEOPLASM OF CENTRAL PORTION OF LEFT BREAST IN FEMALE, ESTROGEN RECEPTOR POSITIVE: Primary | ICD-10-CM

## 2025-07-30 ENCOUNTER — TELEPHONE (OUTPATIENT)
Dept: RADIATION ONCOLOGY | Facility: CLINIC | Age: 60
End: 2025-07-30
Payer: COMMERCIAL

## 2025-08-04 ENCOUNTER — HOSPITAL ENCOUNTER (OUTPATIENT)
Dept: RADIATION ONCOLOGY | Facility: CLINIC | Age: 60
Setting detail: RADIATION/ONCOLOGY SERIES
Discharge: HOME | End: 2025-08-04
Payer: COMMERCIAL

## 2025-08-04 VITALS
HEIGHT: 63 IN | BODY MASS INDEX: 42.34 KG/M2 | TEMPERATURE: 97.3 F | DIASTOLIC BLOOD PRESSURE: 84 MMHG | OXYGEN SATURATION: 99 % | WEIGHT: 238.98 LBS | RESPIRATION RATE: 18 BRPM | SYSTOLIC BLOOD PRESSURE: 149 MMHG | HEART RATE: 73 BPM

## 2025-08-04 DIAGNOSIS — C50.112 MALIGNANT NEOPLASM OF CENTRAL PORTION OF LEFT BREAST IN FEMALE, ESTROGEN RECEPTOR POSITIVE: Primary | ICD-10-CM

## 2025-08-04 DIAGNOSIS — Z17.0 MALIGNANT NEOPLASM OF CENTRAL PORTION OF LEFT BREAST IN FEMALE, ESTROGEN RECEPTOR POSITIVE: Primary | ICD-10-CM

## 2025-08-04 PROCEDURE — 99205 OFFICE O/P NEW HI 60 MIN: CPT | Performed by: RADIOLOGY

## 2025-08-04 PROCEDURE — 99215 OFFICE O/P EST HI 40 MIN: CPT | Performed by: RADIOLOGY

## 2025-08-04 ASSESSMENT — ENCOUNTER SYMPTOMS
LOSS OF SENSATION IN FEET: 0
FATIGUE: 1
CARDIOVASCULAR NEGATIVE: 1
EYES NEGATIVE: 1
NEUROLOGICAL NEGATIVE: 1
FEVER: 0
RESPIRATORY NEGATIVE: 1
HEMATOLOGIC/LYMPHATIC NEGATIVE: 1
ENDOCRINE NEGATIVE: 1
CHILLS: 0
MUSCULOSKELETAL NEGATIVE: 1
DIAPHORESIS: 0
SLEEP DISTURBANCE: 0
APPETITE CHANGE: 0
OCCASIONAL FEELINGS OF UNSTEADINESS: 0
NERVOUS/ANXIOUS: 1
DECREASED CONCENTRATION: 0
UNEXPECTED WEIGHT CHANGE: 0
DEPRESSION: 0
CONFUSION: 0
GASTROINTESTINAL NEGATIVE: 1

## 2025-08-04 ASSESSMENT — COLUMBIA-SUICIDE SEVERITY RATING SCALE - C-SSRS
6. HAVE YOU EVER DONE ANYTHING, STARTED TO DO ANYTHING, OR PREPARED TO DO ANYTHING TO END YOUR LIFE?: NO
1. IN THE PAST MONTH, HAVE YOU WISHED YOU WERE DEAD OR WISHED YOU COULD GO TO SLEEP AND NOT WAKE UP?: NO
2. HAVE YOU ACTUALLY HAD ANY THOUGHTS OF KILLING YOURSELF?: NO

## 2025-08-04 ASSESSMENT — PATIENT HEALTH QUESTIONNAIRE - PHQ9
2. FEELING DOWN, DEPRESSED OR HOPELESS: NOT AT ALL
SUM OF ALL RESPONSES TO PHQ9 QUESTIONS 1 AND 2: 0
1. LITTLE INTEREST OR PLEASURE IN DOING THINGS: NOT AT ALL

## 2025-08-04 ASSESSMENT — PAIN SCALES - GENERAL: PAINLEVEL_OUTOF10: 0-NO PAIN

## 2025-08-04 NOTE — PROGRESS NOTES
Radiation Oncology Nursing Note    Prior Radiotherapy:  No  No radiation treatments to show. (Treatments may have been administered in another system.)     Current Systemic Treatment:  No     Presence of Pacemaker or ICD:  No    History of Autoimmune or Connective Tissue Disorders:  No    Pain: The patient's current pain level was assessed.  They report currently having a pain of 0 out of 10.  They feel their pain is under control without the use of pain medications.    Review of Systems:  Review of Systems   Constitutional:  Positive for fatigue. Negative for appetite change, chills, diaphoresis, fever and unexpected weight change.   HENT:  Negative.     Eyes: Negative.    Respiratory: Negative.     Cardiovascular: Negative.    Gastrointestinal: Negative.    Endocrine: Negative.    Genitourinary: Negative.     Musculoskeletal: Negative.    Skin: Negative.    Neurological: Negative.    Hematological: Negative.    Psychiatric/Behavioral:  Negative for confusion, decreased concentration, depression, sleep disturbance and suicidal ideas. The patient is nervous/anxious (due to cancer).

## 2025-08-04 NOTE — PROGRESS NOTES
Staff Physician: Luzma Denson MD  Referring Physician: Herber Waddell DO  Date of Service: 8/4/2025  Patient name: Mireya Ramsey   MRN: [id number]    RADIATION ONCOLOGY CONSULT NOTE      IDENTIFICATION  Ms. Mireya Ramsey is a 59-year-old with LEFT breast [id number] IDC ER+MS-Her2- (1.2 cm IDC, grade 2, no LVI, focal DCIS grade 2, margins negative > 2 mm, 1 / 3 SLN with 5.3 mm disease) treated with partial mastectomy and SLN biopsy.   She presents for consideration of adjuvant radiation therapy.      ONCOLOGIC HISTORY:    12/2023 Screening MMG: focal asymmetry outer left breast    12/28/2023 Left Dx MMG/US: persistent though less conspicuous focal asymmetry left breast on spot compression views.  No US correlate.  BIRADS-3, 6 mo FU.    7/12/2024 Left Dx MMG/US: stable persistent asymmetry left breast on MMG, without US correlate, BIRADS-3, 6 mo FU    1/29/2025 BL Dx MMG/US: left breast @ 12-1 o'clock N+ 6 cm there is 0.9 x 0.7 x 0.6 cm spiculated mass.  US @ 12 o'clock N+6 cm demonstrated 0.8 x 0.6 x 0.6 cm hypoechoic mass.  Left axillary US demonstrates normal nodes.    2/6/2025 Left US guided biopsy: IDC grade 2 ER+ >95% MS neg Her 2neg (1+IHC)    3/20/2025 Left MagSeed Partial Mastectomy and SLN biopsy: 1.2 cm IDC grade 2, no LVI, focal DCIS grade 2, margins negative > 2 mm on invasive and DCIS component.  1 of 3 nodes involved 5.3 mm disease.  [id number].        7/2025 Dr Waddell ([facility]) - Onoctype dx pending, discussed systemic therapy options.    She had no breast symptoms at diagnosis.  Abnormality had been first detected in left breast in 12/2023, BIRADS 3,followed q 6 months.  US correlate detected in 1/2025 and above work-up ensued.  She has healed from surgery.  She has no swelling in her arm.  She has never had RT before.  No AID/CTD, no implantable devices.    Works as  -  may be able to work from home during radiation to mitigate long commute in opposite direction of [place]  "History:  Great Aunt Maternal - breast cancer  Father: NHL, prostate cancer - non metastatic    Gyn Hx  Menarche - 8 years old  Menopause - by 48  Depoprovera until age 48, periods did not resume, Depo x 20 years  , first at 30 years old      PAST MEDICAL HISTORY:  Medical History[1]  PAST SURGICAL HISTORY:  Surgical History[2]  ALLERGIES:  Allergies[3]  MEDICATIONS:  Current Medications[4]   SOCIAL HISTORY:  Social History     Tobacco Use    Smoking status: Never    Smokeless tobacco: Never   Substance Use Topics    Alcohol use: Not Currently     Alcohol/week: 1.0 standard drink of alcohol     Types: 1 Cans of beer per week     Comment: Occasionally \"maybe one beer every couple of weeks\"     FAMILY HISTORY:  Family History[5]    REVIEW OF SYSTEMS:  Please refer to RN note.    PHYSICAL EXAMINATION:  There were no vitals taken for this visit.  Physical Exam   Gen: well appearing NAD ECOG 0  HEENT; neck supple no CHRISTOPHER BL SCV fossa  Breast:  Left breast well healed curvilinear incision along NAC from 11-1 o'clock left breast, no masses, skin clear, nipple everted  Right breast: no masses skin clear, nipple everted  Axilla: no CHRISTOPHER BL  Ext: no UE edema BL    CTCAE (v5) ADVERSE EVENTS:      PERFORMANCE STATUS:  KPS/ECO, Fully active, able to carry on all pre-disease performed without restriction (ECOG equivalent 0)    LABORATORY AND IMAGING DATA:  Imaging: All imaging was personally reviewed and interpreted in clinic. Findings as per HPI and EMR.    No valid procedures specified.     Laboratory/Pathology:  All pertinent labs and pathology were personally reviewed and interpreted in clinic. Findings as per HPI and EMR.      IMPRESSION:  Ms. Mireya Ramsey is a 59-year-old with LEFT breast [id number] IDC ER+LA-Her2- (1.2 cm IDC, grade 2, no LVI, focal DCIS grade 2, margins negative > 2 mm, 1 / 3 SLN with 5.3 mm disease) treated with partial mastectomy and SLN biopsy.   She presents for consideration of adjuvant " radiation therapy.      I recommend a course of adjuvant radiation to the breast and regional nodes.  We discussed comprehensive versus more limited adjuvant axillary radiation.  If her oncotype comes back low risk we can consider more limited francesco radiation.  She meets eligibility criteria for MA.39 if oncotype < 26 which randomizes patients to whole breast RT +/- RNI.  However she is outside the 12 week window for enrollment.  She is inclined to proceed with comprehensive radiation therapy which is reasonable.      Finally, we discussed the side effects and risk of RT, including the need for CT simulation, tattoo placement, and daily radiation (M-F) x 20 days. We discussed the side effects and risks of radiation, including fatigue, radiation dermatitis, and breast pain in the short-term; skin fibrosis, pigmentation, chest wall pain, damage to underlying lung, rib, or heart, and secondary malignancies in the long-term. All questions were answered to the best of our ability and informed consent was obtained.     PLAN:  - Left breast and Regional Francesco Radiation 42.56 Gy in 16 fractions + 10 Gy in 4 fraction tumor bed boost  - Await oncotype dx and final systemic therapy recommendations   - Discussed with medical oncology - oncotype results will take another few weeks, in order to not cause delay in care will get her started on radiation and if necessary consider chemo after surgery  - Simulation this week with radiation to commence shortly thereafter.    Thank you for the opportunity to participate in the care of this kind patient.    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    Peripheral edema     bilateral ankles    Wears glasses    [2]   Past Surgical History:  Procedure Laterality Date    ADENOIDECTOMY      BREAST BIOPSY Left 2-12-25    BREAST CYST EXCISION  2018    COLON SURGERY  2006    colon resection for diverticulosis, no colostomy    COLONOSCOPY      HERNIA REPAIR  2007    abdominal incisional hernia repair    INNER EAR SURGERY Right     attempted to correct hearing loss    TONSILLECTOMY     [3] No Known Allergies  [4]   Current Outpatient Medications:     amLODIPine (Norvasc) 5 mg tablet, 1 tablet (5 mg)., Disp: , Rfl:     calcium carbonate-vitamin D3 (Calcium 600 + D,3,) 600 mg-10 mcg (400 unit) tablet, Take 1 tablet by mouth once daily., Disp: , Rfl:     cranberry/vitamin C/d-mannose (CRANBERRY-VITAMIN C-MANNOSE ORAL), Take 1 capsule by mouth once daily., Disp: , Rfl:     fenofibrate micronized (Lofibra) 134 mg capsule, Take 1 capsule (134 mg) by mouth once daily., Disp: , Rfl:     ibuprofen 800 mg tablet, Take 1 tablet (800 mg) by mouth every 8 hours if needed for mild pain (1 - 3)., Disp: , Rfl:     omega-3s-dha-epa-fish oil (Omega-3 Fish OiL) 300-1,000 mg capsule, Take 1 capsule (300 mg) by mouth 2 times a day., Disp: , Rfl:     omeprazole (PriLOSEC) 40 mg DR capsule, Take 1 capsule (40 mg) by mouth once daily in the morning. Take before meals. Do not crush or chew., Disp: , Rfl:     potassium chloride (Klor-Con) 20 mEq packet, Take 20 mEq by mouth once daily., Disp: , Rfl:     rosuvastatin (Crestor) 10 mg tablet, Take 1 tablet (10 mg) by mouth once daily., Disp: , Rfl:     torsemide (Demadex) 20 mg tablet, Take 1 tablet (20 mg) by mouth once daily., Disp: , Rfl:   [5]   Family History  Problem Relation Name Age of Onset    Hodgkin's lymphoma Father      Colon cancer Paternal Grandmother Luisa     Colon cancer Paternal Grandmother Luisa     Colon cancer Paternal Grandmother Luisa

## 2025-08-06 ENCOUNTER — SOCIAL WORK (OUTPATIENT)
Dept: CASE MANAGEMENT | Facility: HOSPITAL | Age: 60
End: 2025-08-06
Payer: COMMERCIAL

## 2025-08-06 ENCOUNTER — HOSPITAL ENCOUNTER (OUTPATIENT)
Dept: RADIOLOGY | Facility: EXTERNAL LOCATION | Age: 60
Discharge: HOME | End: 2025-08-06

## 2025-08-06 ENCOUNTER — TELEPHONE (OUTPATIENT)
Dept: RADIATION ONCOLOGY | Facility: CLINIC | Age: 60
End: 2025-08-06
Payer: COMMERCIAL

## 2025-08-06 ENCOUNTER — HOSPITAL ENCOUNTER (OUTPATIENT)
Dept: RADIATION ONCOLOGY | Facility: CLINIC | Age: 60
Setting detail: RADIATION/ONCOLOGY SERIES
Discharge: HOME | End: 2025-08-06
Payer: COMMERCIAL

## 2025-08-06 DIAGNOSIS — C50.412 MALIGNANT NEOPLASM OF UPPER-OUTER QUADRANT OF LEFT FEMALE BREAST, UNSPECIFIED ESTROGEN RECEPTOR STATUS: ICD-10-CM

## 2025-08-06 PROCEDURE — 77293 RESPIRATOR MOTION MGMT SIMUL: CPT | Performed by: RADIOLOGY

## 2025-08-06 PROCEDURE — 77290 THER RAD SIMULAJ FIELD CPLX: CPT | Performed by: RADIOLOGY

## 2025-08-06 PROCEDURE — 77334 RADIATION TREATMENT AID(S): CPT | Performed by: RADIOLOGY

## 2025-08-06 NOTE — PROGRESS NOTES
Distress Thermometer Referral    Distress Score: 7  SW left a vm yesterday to talk about her new patient visit and services/resources that this writer can assist with regarding the distress thermometer.  She was encouraged to contact this writer.    PERICO will continue to follow as needed.  Nieves ANTUNEZ, [facility] [phone]

## 2025-08-12 ENCOUNTER — TELEPHONE (OUTPATIENT)
Dept: RADIATION ONCOLOGY | Facility: CLINIC | Age: 60
End: 2025-08-12
Payer: COMMERCIAL

## 2025-08-13 ENCOUNTER — HOSPITAL ENCOUNTER (OUTPATIENT)
Dept: RADIATION ONCOLOGY | Facility: CLINIC | Age: 60
Setting detail: RADIATION/ONCOLOGY SERIES
Discharge: HOME | End: 2025-08-13
Payer: COMMERCIAL

## 2025-08-13 PROCEDURE — 77300 RADIATION THERAPY DOSE PLAN: CPT | Performed by: RADIOLOGY

## 2025-08-13 PROCEDURE — 77334 RADIATION TREATMENT AID(S): CPT | Performed by: RADIOLOGY

## 2025-08-13 PROCEDURE — 77293 RESPIRATOR MOTION MGMT SIMUL: CPT | Performed by: RADIOLOGY

## 2025-08-13 PROCEDURE — 77295 3-D RADIOTHERAPY PLAN: CPT | Performed by: RADIOLOGY

## 2025-08-14 PROCEDURE — 77336 RADIATION PHYSICS CONSULT: CPT | Performed by: RADIOLOGY

## 2025-08-18 ENCOUNTER — HOSPITAL ENCOUNTER (OUTPATIENT)
Dept: RADIATION ONCOLOGY | Facility: CLINIC | Age: 60
Setting detail: RADIATION/ONCOLOGY SERIES
Discharge: HOME | End: 2025-08-18
Payer: COMMERCIAL

## 2025-08-18 DIAGNOSIS — C50.412 MALIGNANT NEOPLASM OF UPPER-OUTER QUADRANT OF LEFT FEMALE BREAST: ICD-10-CM

## 2025-08-18 DIAGNOSIS — C77.3 SECONDARY AND UNSPECIFIED MALIGNANT NEOPLASM OF AXILLA AND UPPER LIMB LYMPH NODES: ICD-10-CM

## 2025-08-18 LAB
RAD ONC MSQ ACTUAL FRACTIONS DELIVERED: 1
RAD ONC MSQ ACTUAL FRACTIONS DELIVERED: 1
RAD ONC MSQ ACTUAL SESSION DELIVERED DOSE: 266 CGRAY
RAD ONC MSQ ACTUAL SESSION DELIVERED DOSE: 266 CGRAY
RAD ONC MSQ ACTUAL TOTAL DOSE: 266 CGRAY
RAD ONC MSQ ACTUAL TOTAL DOSE: 266 CGRAY
RAD ONC MSQ ELAPSED DAYS: 0
RAD ONC MSQ ELAPSED DAYS: 0
RAD ONC MSQ LAST DATE: NORMAL
RAD ONC MSQ LAST DATE: NORMAL
RAD ONC MSQ PRESCRIBED FRACTIONAL DOSE: 266 CGRAY
RAD ONC MSQ PRESCRIBED FRACTIONAL DOSE: 266 CGRAY
RAD ONC MSQ PRESCRIBED NUMBER OF FRACTIONS: 16
RAD ONC MSQ PRESCRIBED NUMBER OF FRACTIONS: 16
RAD ONC MSQ PRESCRIBED TECHNIQUE: NORMAL
RAD ONC MSQ PRESCRIBED TECHNIQUE: NORMAL
RAD ONC MSQ PRESCRIBED TOTAL DOSE: 4256 CGRAY
RAD ONC MSQ PRESCRIBED TOTAL DOSE: 4256 CGRAY
RAD ONC MSQ START DATE: NORMAL
RAD ONC MSQ START DATE: NORMAL
RAD ONC MSQ TREATMENT COURSE NUMBER: 1
RAD ONC MSQ TREATMENT COURSE NUMBER: 1
RAD ONC MSQ TREATMENT SITE: NORMAL
RAD ONC MSQ TREATMENT SITE: NORMAL

## 2025-08-18 PROCEDURE — 77280 THER RAD SIMULAJ FIELD SMPL: CPT | Performed by: STUDENT IN AN ORGANIZED HEALTH CARE EDUCATION/TRAINING PROGRAM

## 2025-08-18 PROCEDURE — 77412 RADIATION TX DELIVERY LVL 3: CPT | Performed by: RADIOLOGY

## 2025-08-19 ENCOUNTER — HOSPITAL ENCOUNTER (OUTPATIENT)
Dept: RADIATION ONCOLOGY | Facility: CLINIC | Age: 60
Setting detail: RADIATION/ONCOLOGY SERIES
Discharge: HOME | End: 2025-08-19
Payer: COMMERCIAL

## 2025-08-19 DIAGNOSIS — C77.3 SECONDARY AND UNSPECIFIED MALIGNANT NEOPLASM OF AXILLA AND UPPER LIMB LYMPH NODES: ICD-10-CM

## 2025-08-19 DIAGNOSIS — Z51.0 ENCOUNTER FOR ANTINEOPLASTIC RADIATION THERAPY: ICD-10-CM

## 2025-08-19 DIAGNOSIS — C50.412 MALIGNANT NEOPLASM OF UPPER-OUTER QUADRANT OF LEFT FEMALE BREAST: ICD-10-CM

## 2025-08-19 LAB
RAD ONC MSQ ACTUAL FRACTIONS DELIVERED: 2
RAD ONC MSQ ACTUAL FRACTIONS DELIVERED: 2
RAD ONC MSQ ACTUAL SESSION DELIVERED DOSE: 266 CGRAY
RAD ONC MSQ ACTUAL SESSION DELIVERED DOSE: 266 CGRAY
RAD ONC MSQ ACTUAL TOTAL DOSE: 532 CGRAY
RAD ONC MSQ ACTUAL TOTAL DOSE: 532 CGRAY
RAD ONC MSQ ELAPSED DAYS: 1
RAD ONC MSQ ELAPSED DAYS: 1
RAD ONC MSQ LAST DATE: NORMAL
RAD ONC MSQ LAST DATE: NORMAL
RAD ONC MSQ PRESCRIBED FRACTIONAL DOSE: 266 CGRAY
RAD ONC MSQ PRESCRIBED FRACTIONAL DOSE: 266 CGRAY
RAD ONC MSQ PRESCRIBED NUMBER OF FRACTIONS: 16
RAD ONC MSQ PRESCRIBED NUMBER OF FRACTIONS: 16
RAD ONC MSQ PRESCRIBED TECHNIQUE: NORMAL
RAD ONC MSQ PRESCRIBED TECHNIQUE: NORMAL
RAD ONC MSQ PRESCRIBED TOTAL DOSE: 4256 CGRAY
RAD ONC MSQ PRESCRIBED TOTAL DOSE: 4256 CGRAY
RAD ONC MSQ START DATE: NORMAL
RAD ONC MSQ START DATE: NORMAL
RAD ONC MSQ TREATMENT COURSE NUMBER: 1
RAD ONC MSQ TREATMENT COURSE NUMBER: 1
RAD ONC MSQ TREATMENT SITE: NORMAL
RAD ONC MSQ TREATMENT SITE: NORMAL

## 2025-08-19 PROCEDURE — 77412 RADIATION TX DELIVERY LVL 3: CPT | Performed by: RADIOLOGY

## 2025-08-19 PROCEDURE — 77387 GUIDANCE FOR RADJ TX DLVR: CPT | Performed by: RADIOLOGY

## 2025-08-20 ENCOUNTER — HOSPITAL ENCOUNTER (OUTPATIENT)
Dept: RADIATION ONCOLOGY | Facility: CLINIC | Age: 60
Setting detail: RADIATION/ONCOLOGY SERIES
Discharge: HOME | End: 2025-08-20
Payer: COMMERCIAL

## 2025-08-20 VITALS — WEIGHT: 240.3 LBS | TEMPERATURE: 96.3 F | BODY MASS INDEX: 42.31 KG/M2

## 2025-08-20 DIAGNOSIS — C77.3 SECONDARY AND UNSPECIFIED MALIGNANT NEOPLASM OF AXILLA AND UPPER LIMB LYMPH NODES: ICD-10-CM

## 2025-08-20 DIAGNOSIS — C50.412 MALIGNANT NEOPLASM OF UPPER-OUTER QUADRANT OF LEFT FEMALE BREAST: ICD-10-CM

## 2025-08-20 DIAGNOSIS — Z51.0 ENCOUNTER FOR ANTINEOPLASTIC RADIATION THERAPY: ICD-10-CM

## 2025-08-20 LAB
RAD ONC MSQ ACTUAL FRACTIONS DELIVERED: 3
RAD ONC MSQ ACTUAL FRACTIONS DELIVERED: 3
RAD ONC MSQ ACTUAL SESSION DELIVERED DOSE: 266 CGRAY
RAD ONC MSQ ACTUAL SESSION DELIVERED DOSE: 266 CGRAY
RAD ONC MSQ ACTUAL TOTAL DOSE: 798 CGRAY
RAD ONC MSQ ACTUAL TOTAL DOSE: 798 CGRAY
RAD ONC MSQ ELAPSED DAYS: 2
RAD ONC MSQ ELAPSED DAYS: 2
RAD ONC MSQ LAST DATE: NORMAL
RAD ONC MSQ LAST DATE: NORMAL
RAD ONC MSQ PRESCRIBED FRACTIONAL DOSE: 266 CGRAY
RAD ONC MSQ PRESCRIBED FRACTIONAL DOSE: 266 CGRAY
RAD ONC MSQ PRESCRIBED NUMBER OF FRACTIONS: 16
RAD ONC MSQ PRESCRIBED NUMBER OF FRACTIONS: 16
RAD ONC MSQ PRESCRIBED TECHNIQUE: NORMAL
RAD ONC MSQ PRESCRIBED TECHNIQUE: NORMAL
RAD ONC MSQ PRESCRIBED TOTAL DOSE: 4256 CGRAY
RAD ONC MSQ PRESCRIBED TOTAL DOSE: 4256 CGRAY
RAD ONC MSQ START DATE: NORMAL
RAD ONC MSQ START DATE: NORMAL
RAD ONC MSQ TREATMENT COURSE NUMBER: 1
RAD ONC MSQ TREATMENT COURSE NUMBER: 1
RAD ONC MSQ TREATMENT SITE: NORMAL
RAD ONC MSQ TREATMENT SITE: NORMAL

## 2025-08-20 PROCEDURE — 77387 GUIDANCE FOR RADJ TX DLVR: CPT | Performed by: RADIOLOGY

## 2025-08-20 PROCEDURE — 77412 RADIATION TX DELIVERY LVL 3: CPT | Performed by: RADIOLOGY

## 2025-08-20 ASSESSMENT — PAIN SCALES - GENERAL: PAINLEVEL_OUTOF10: 0-NO PAIN

## 2025-08-21 ENCOUNTER — HOSPITAL ENCOUNTER (OUTPATIENT)
Dept: RADIATION ONCOLOGY | Facility: CLINIC | Age: 60
Setting detail: RADIATION/ONCOLOGY SERIES
Discharge: HOME | End: 2025-08-21
Payer: COMMERCIAL

## 2025-08-21 DIAGNOSIS — C77.3 SECONDARY AND UNSPECIFIED MALIGNANT NEOPLASM OF AXILLA AND UPPER LIMB LYMPH NODES: ICD-10-CM

## 2025-08-21 DIAGNOSIS — C50.412 MALIGNANT NEOPLASM OF UPPER-OUTER QUADRANT OF LEFT FEMALE BREAST: ICD-10-CM

## 2025-08-21 DIAGNOSIS — Z51.0 ENCOUNTER FOR ANTINEOPLASTIC RADIATION THERAPY: ICD-10-CM

## 2025-08-21 LAB
RAD ONC MSQ ACTUAL FRACTIONS DELIVERED: 4
RAD ONC MSQ ACTUAL FRACTIONS DELIVERED: 4
RAD ONC MSQ ACTUAL SESSION DELIVERED DOSE: 266 CGRAY
RAD ONC MSQ ACTUAL SESSION DELIVERED DOSE: 266 CGRAY
RAD ONC MSQ ACTUAL TOTAL DOSE: 1064 CGRAY
RAD ONC MSQ ACTUAL TOTAL DOSE: 1064 CGRAY
RAD ONC MSQ ELAPSED DAYS: 3
RAD ONC MSQ ELAPSED DAYS: 3
RAD ONC MSQ LAST DATE: NORMAL
RAD ONC MSQ LAST DATE: NORMAL
RAD ONC MSQ PRESCRIBED FRACTIONAL DOSE: 266 CGRAY
RAD ONC MSQ PRESCRIBED FRACTIONAL DOSE: 266 CGRAY
RAD ONC MSQ PRESCRIBED NUMBER OF FRACTIONS: 16
RAD ONC MSQ PRESCRIBED NUMBER OF FRACTIONS: 16
RAD ONC MSQ PRESCRIBED TECHNIQUE: NORMAL
RAD ONC MSQ PRESCRIBED TECHNIQUE: NORMAL
RAD ONC MSQ PRESCRIBED TOTAL DOSE: 4256 CGRAY
RAD ONC MSQ PRESCRIBED TOTAL DOSE: 4256 CGRAY
RAD ONC MSQ START DATE: NORMAL
RAD ONC MSQ START DATE: NORMAL
RAD ONC MSQ TREATMENT COURSE NUMBER: 1
RAD ONC MSQ TREATMENT COURSE NUMBER: 1
RAD ONC MSQ TREATMENT SITE: NORMAL
RAD ONC MSQ TREATMENT SITE: NORMAL

## 2025-08-21 PROCEDURE — 77412 RADIATION TX DELIVERY LVL 3: CPT | Performed by: RADIOLOGY

## 2025-08-21 PROCEDURE — 77387 GUIDANCE FOR RADJ TX DLVR: CPT | Performed by: RADIOLOGY

## 2025-08-22 ENCOUNTER — HOSPITAL ENCOUNTER (OUTPATIENT)
Dept: RADIATION ONCOLOGY | Facility: CLINIC | Age: 60
Setting detail: RADIATION/ONCOLOGY SERIES
Discharge: HOME | End: 2025-08-22
Payer: COMMERCIAL

## 2025-08-22 DIAGNOSIS — C50.412 MALIGNANT NEOPLASM OF UPPER-OUTER QUADRANT OF LEFT FEMALE BREAST: ICD-10-CM

## 2025-08-22 DIAGNOSIS — Z51.0 ENCOUNTER FOR ANTINEOPLASTIC RADIATION THERAPY: ICD-10-CM

## 2025-08-22 DIAGNOSIS — C77.3 SECONDARY AND UNSPECIFIED MALIGNANT NEOPLASM OF AXILLA AND UPPER LIMB LYMPH NODES: ICD-10-CM

## 2025-08-22 LAB
RAD ONC MSQ ACTUAL FRACTIONS DELIVERED: 5
RAD ONC MSQ ACTUAL FRACTIONS DELIVERED: 5
RAD ONC MSQ ACTUAL SESSION DELIVERED DOSE: 266 CGRAY
RAD ONC MSQ ACTUAL SESSION DELIVERED DOSE: 266 CGRAY
RAD ONC MSQ ACTUAL TOTAL DOSE: 1330 CGRAY
RAD ONC MSQ ACTUAL TOTAL DOSE: 1330 CGRAY
RAD ONC MSQ ELAPSED DAYS: 4
RAD ONC MSQ ELAPSED DAYS: 4
RAD ONC MSQ LAST DATE: NORMAL
RAD ONC MSQ LAST DATE: NORMAL
RAD ONC MSQ PRESCRIBED FRACTIONAL DOSE: 266 CGRAY
RAD ONC MSQ PRESCRIBED FRACTIONAL DOSE: 266 CGRAY
RAD ONC MSQ PRESCRIBED NUMBER OF FRACTIONS: 16
RAD ONC MSQ PRESCRIBED NUMBER OF FRACTIONS: 16
RAD ONC MSQ PRESCRIBED TECHNIQUE: NORMAL
RAD ONC MSQ PRESCRIBED TECHNIQUE: NORMAL
RAD ONC MSQ PRESCRIBED TOTAL DOSE: 4256 CGRAY
RAD ONC MSQ PRESCRIBED TOTAL DOSE: 4256 CGRAY
RAD ONC MSQ START DATE: NORMAL
RAD ONC MSQ START DATE: NORMAL
RAD ONC MSQ TREATMENT COURSE NUMBER: 1
RAD ONC MSQ TREATMENT COURSE NUMBER: 1
RAD ONC MSQ TREATMENT SITE: NORMAL
RAD ONC MSQ TREATMENT SITE: NORMAL

## 2025-08-25 ENCOUNTER — HOSPITAL ENCOUNTER (OUTPATIENT)
Dept: RADIATION ONCOLOGY | Facility: CLINIC | Age: 60
Setting detail: RADIATION/ONCOLOGY SERIES
Discharge: HOME | End: 2025-08-25
Payer: COMMERCIAL

## 2025-08-25 DIAGNOSIS — C50.412 MALIGNANT NEOPLASM OF UPPER-OUTER QUADRANT OF LEFT FEMALE BREAST: ICD-10-CM

## 2025-08-25 DIAGNOSIS — C77.3 SECONDARY AND UNSPECIFIED MALIGNANT NEOPLASM OF AXILLA AND UPPER LIMB LYMPH NODES: ICD-10-CM

## 2025-08-25 DIAGNOSIS — Z51.0 ENCOUNTER FOR ANTINEOPLASTIC RADIATION THERAPY: ICD-10-CM

## 2025-08-25 LAB
RAD ONC MSQ ACTUAL FRACTIONS DELIVERED: 6
RAD ONC MSQ ACTUAL FRACTIONS DELIVERED: 6
RAD ONC MSQ ACTUAL SESSION DELIVERED DOSE: 266 CGRAY
RAD ONC MSQ ACTUAL SESSION DELIVERED DOSE: 266 CGRAY
RAD ONC MSQ ACTUAL TOTAL DOSE: 1596 CGRAY
RAD ONC MSQ ACTUAL TOTAL DOSE: 1596 CGRAY
RAD ONC MSQ ELAPSED DAYS: 7
RAD ONC MSQ ELAPSED DAYS: 7
RAD ONC MSQ LAST DATE: NORMAL
RAD ONC MSQ LAST DATE: NORMAL
RAD ONC MSQ PRESCRIBED FRACTIONAL DOSE: 266 CGRAY
RAD ONC MSQ PRESCRIBED FRACTIONAL DOSE: 266 CGRAY
RAD ONC MSQ PRESCRIBED NUMBER OF FRACTIONS: 16
RAD ONC MSQ PRESCRIBED NUMBER OF FRACTIONS: 16
RAD ONC MSQ PRESCRIBED TECHNIQUE: NORMAL
RAD ONC MSQ PRESCRIBED TECHNIQUE: NORMAL
RAD ONC MSQ PRESCRIBED TOTAL DOSE: 4256 CGRAY
RAD ONC MSQ PRESCRIBED TOTAL DOSE: 4256 CGRAY
RAD ONC MSQ START DATE: NORMAL
RAD ONC MSQ START DATE: NORMAL
RAD ONC MSQ TREATMENT COURSE NUMBER: 1
RAD ONC MSQ TREATMENT COURSE NUMBER: 1
RAD ONC MSQ TREATMENT SITE: NORMAL
RAD ONC MSQ TREATMENT SITE: NORMAL

## 2025-08-25 PROCEDURE — 77387 GUIDANCE FOR RADJ TX DLVR: CPT | Performed by: RADIOLOGY

## 2025-08-25 PROCEDURE — 77412 RADIATION TX DELIVERY LVL 3: CPT | Performed by: RADIOLOGY

## 2025-08-26 ENCOUNTER — HOSPITAL ENCOUNTER (OUTPATIENT)
Dept: RADIATION ONCOLOGY | Facility: CLINIC | Age: 60
Setting detail: RADIATION/ONCOLOGY SERIES
Discharge: HOME | End: 2025-08-26
Payer: COMMERCIAL

## 2025-08-26 DIAGNOSIS — C77.3 SECONDARY AND UNSPECIFIED MALIGNANT NEOPLASM OF AXILLA AND UPPER LIMB LYMPH NODES: ICD-10-CM

## 2025-08-26 DIAGNOSIS — C50.412 MALIGNANT NEOPLASM OF UPPER-OUTER QUADRANT OF LEFT FEMALE BREAST: ICD-10-CM

## 2025-08-26 DIAGNOSIS — Z51.0 ENCOUNTER FOR ANTINEOPLASTIC RADIATION THERAPY: ICD-10-CM

## 2025-08-26 LAB
RAD ONC MSQ ACTUAL FRACTIONS DELIVERED: 7
RAD ONC MSQ ACTUAL FRACTIONS DELIVERED: 7
RAD ONC MSQ ACTUAL SESSION DELIVERED DOSE: 266 CGRAY
RAD ONC MSQ ACTUAL SESSION DELIVERED DOSE: 266 CGRAY
RAD ONC MSQ ACTUAL TOTAL DOSE: 1862 CGRAY
RAD ONC MSQ ACTUAL TOTAL DOSE: 1862 CGRAY
RAD ONC MSQ ELAPSED DAYS: 8
RAD ONC MSQ ELAPSED DAYS: 8
RAD ONC MSQ LAST DATE: NORMAL
RAD ONC MSQ LAST DATE: NORMAL
RAD ONC MSQ PRESCRIBED FRACTIONAL DOSE: 266 CGRAY
RAD ONC MSQ PRESCRIBED FRACTIONAL DOSE: 266 CGRAY
RAD ONC MSQ PRESCRIBED NUMBER OF FRACTIONS: 16
RAD ONC MSQ PRESCRIBED NUMBER OF FRACTIONS: 16
RAD ONC MSQ PRESCRIBED TECHNIQUE: NORMAL
RAD ONC MSQ PRESCRIBED TECHNIQUE: NORMAL
RAD ONC MSQ PRESCRIBED TOTAL DOSE: 4256 CGRAY
RAD ONC MSQ PRESCRIBED TOTAL DOSE: 4256 CGRAY
RAD ONC MSQ START DATE: NORMAL
RAD ONC MSQ START DATE: NORMAL
RAD ONC MSQ TREATMENT COURSE NUMBER: 1
RAD ONC MSQ TREATMENT COURSE NUMBER: 1
RAD ONC MSQ TREATMENT SITE: NORMAL
RAD ONC MSQ TREATMENT SITE: NORMAL

## 2025-08-26 PROCEDURE — 77412 RADIATION TX DELIVERY LVL 3: CPT | Performed by: RADIOLOGY

## 2025-08-26 PROCEDURE — 77387 GUIDANCE FOR RADJ TX DLVR: CPT | Performed by: RADIOLOGY

## 2025-08-27 ENCOUNTER — HOSPITAL ENCOUNTER (OUTPATIENT)
Dept: RADIATION ONCOLOGY | Facility: CLINIC | Age: 60
Setting detail: RADIATION/ONCOLOGY SERIES
Discharge: HOME | End: 2025-08-27
Payer: COMMERCIAL

## 2025-08-27 VITALS — TEMPERATURE: 96.6 F | BODY MASS INDEX: 42.35 KG/M2 | WEIGHT: 240.52 LBS

## 2025-08-27 DIAGNOSIS — C77.3 SECONDARY AND UNSPECIFIED MALIGNANT NEOPLASM OF AXILLA AND UPPER LIMB LYMPH NODES: ICD-10-CM

## 2025-08-27 DIAGNOSIS — Z51.0 ENCOUNTER FOR ANTINEOPLASTIC RADIATION THERAPY: ICD-10-CM

## 2025-08-27 DIAGNOSIS — C50.412 MALIGNANT NEOPLASM OF UPPER-OUTER QUADRANT OF LEFT FEMALE BREAST: ICD-10-CM

## 2025-08-27 LAB
RAD ONC MSQ ACTUAL FRACTIONS DELIVERED: 8
RAD ONC MSQ ACTUAL FRACTIONS DELIVERED: 8
RAD ONC MSQ ACTUAL SESSION DELIVERED DOSE: 266 CGRAY
RAD ONC MSQ ACTUAL SESSION DELIVERED DOSE: 266 CGRAY
RAD ONC MSQ ACTUAL TOTAL DOSE: 2128 CGRAY
RAD ONC MSQ ACTUAL TOTAL DOSE: 2128 CGRAY
RAD ONC MSQ ELAPSED DAYS: 9
RAD ONC MSQ ELAPSED DAYS: 9
RAD ONC MSQ LAST DATE: NORMAL
RAD ONC MSQ LAST DATE: NORMAL
RAD ONC MSQ PRESCRIBED FRACTIONAL DOSE: 266 CGRAY
RAD ONC MSQ PRESCRIBED FRACTIONAL DOSE: 266 CGRAY
RAD ONC MSQ PRESCRIBED NUMBER OF FRACTIONS: 16
RAD ONC MSQ PRESCRIBED NUMBER OF FRACTIONS: 16
RAD ONC MSQ PRESCRIBED TECHNIQUE: NORMAL
RAD ONC MSQ PRESCRIBED TECHNIQUE: NORMAL
RAD ONC MSQ PRESCRIBED TOTAL DOSE: 4256 CGRAY
RAD ONC MSQ PRESCRIBED TOTAL DOSE: 4256 CGRAY
RAD ONC MSQ START DATE: NORMAL
RAD ONC MSQ START DATE: NORMAL
RAD ONC MSQ TREATMENT COURSE NUMBER: 1
RAD ONC MSQ TREATMENT COURSE NUMBER: 1
RAD ONC MSQ TREATMENT SITE: NORMAL
RAD ONC MSQ TREATMENT SITE: NORMAL

## 2025-08-27 PROCEDURE — 77412 RADIATION TX DELIVERY LVL 3: CPT | Performed by: RADIOLOGY

## 2025-08-27 PROCEDURE — 77336 RADIATION PHYSICS CONSULT: CPT | Performed by: RADIOLOGY

## 2025-08-27 PROCEDURE — 77387 GUIDANCE FOR RADJ TX DLVR: CPT | Performed by: RADIOLOGY

## 2025-08-27 ASSESSMENT — PAIN SCALES - GENERAL: PAINLEVEL_OUTOF10: 0-NO PAIN

## 2025-08-28 ENCOUNTER — HOSPITAL ENCOUNTER (OUTPATIENT)
Dept: RADIATION ONCOLOGY | Facility: CLINIC | Age: 60
Setting detail: RADIATION/ONCOLOGY SERIES
Discharge: HOME | End: 2025-08-28
Payer: COMMERCIAL

## 2025-08-28 DIAGNOSIS — Z51.0 ENCOUNTER FOR ANTINEOPLASTIC RADIATION THERAPY: ICD-10-CM

## 2025-08-28 DIAGNOSIS — C50.412 MALIGNANT NEOPLASM OF UPPER-OUTER QUADRANT OF LEFT FEMALE BREAST: ICD-10-CM

## 2025-08-28 DIAGNOSIS — C77.3 SECONDARY AND UNSPECIFIED MALIGNANT NEOPLASM OF AXILLA AND UPPER LIMB LYMPH NODES: ICD-10-CM

## 2025-08-28 LAB
RAD ONC MSQ ACTUAL FRACTIONS DELIVERED: 9
RAD ONC MSQ ACTUAL FRACTIONS DELIVERED: 9
RAD ONC MSQ ACTUAL SESSION DELIVERED DOSE: 266 CGRAY
RAD ONC MSQ ACTUAL SESSION DELIVERED DOSE: 266 CGRAY
RAD ONC MSQ ACTUAL TOTAL DOSE: 2394 CGRAY
RAD ONC MSQ ACTUAL TOTAL DOSE: 2394 CGRAY
RAD ONC MSQ ELAPSED DAYS: 10
RAD ONC MSQ ELAPSED DAYS: 10
RAD ONC MSQ LAST DATE: NORMAL
RAD ONC MSQ LAST DATE: NORMAL
RAD ONC MSQ PRESCRIBED FRACTIONAL DOSE: 266 CGRAY
RAD ONC MSQ PRESCRIBED FRACTIONAL DOSE: 266 CGRAY
RAD ONC MSQ PRESCRIBED NUMBER OF FRACTIONS: 16
RAD ONC MSQ PRESCRIBED NUMBER OF FRACTIONS: 16
RAD ONC MSQ PRESCRIBED TECHNIQUE: NORMAL
RAD ONC MSQ PRESCRIBED TECHNIQUE: NORMAL
RAD ONC MSQ PRESCRIBED TOTAL DOSE: 4256 CGRAY
RAD ONC MSQ PRESCRIBED TOTAL DOSE: 4256 CGRAY
RAD ONC MSQ START DATE: NORMAL
RAD ONC MSQ START DATE: NORMAL
RAD ONC MSQ TREATMENT COURSE NUMBER: 1
RAD ONC MSQ TREATMENT COURSE NUMBER: 1
RAD ONC MSQ TREATMENT SITE: NORMAL
RAD ONC MSQ TREATMENT SITE: NORMAL

## 2025-08-28 PROCEDURE — 77412 RADIATION TX DELIVERY LVL 3: CPT | Performed by: RADIOLOGY

## 2025-08-28 PROCEDURE — 77387 GUIDANCE FOR RADJ TX DLVR: CPT | Performed by: RADIOLOGY

## 2025-08-29 ENCOUNTER — HOSPITAL ENCOUNTER (OUTPATIENT)
Dept: RADIATION ONCOLOGY | Facility: CLINIC | Age: 60
Setting detail: RADIATION/ONCOLOGY SERIES
Discharge: HOME | End: 2025-08-29
Payer: COMMERCIAL

## 2025-08-29 DIAGNOSIS — C77.3 SECONDARY AND UNSPECIFIED MALIGNANT NEOPLASM OF AXILLA AND UPPER LIMB LYMPH NODES: ICD-10-CM

## 2025-08-29 DIAGNOSIS — C50.412 MALIGNANT NEOPLASM OF UPPER-OUTER QUADRANT OF LEFT FEMALE BREAST: ICD-10-CM

## 2025-08-29 DIAGNOSIS — Z51.0 ENCOUNTER FOR ANTINEOPLASTIC RADIATION THERAPY: ICD-10-CM

## 2025-08-29 LAB
RAD ONC MSQ ACTUAL FRACTIONS DELIVERED: 10
RAD ONC MSQ ACTUAL FRACTIONS DELIVERED: 10
RAD ONC MSQ ACTUAL SESSION DELIVERED DOSE: 266 CGRAY
RAD ONC MSQ ACTUAL SESSION DELIVERED DOSE: 266 CGRAY
RAD ONC MSQ ACTUAL TOTAL DOSE: 2660 CGRAY
RAD ONC MSQ ACTUAL TOTAL DOSE: 2660 CGRAY
RAD ONC MSQ ELAPSED DAYS: 11
RAD ONC MSQ ELAPSED DAYS: 11
RAD ONC MSQ LAST DATE: NORMAL
RAD ONC MSQ LAST DATE: NORMAL
RAD ONC MSQ PRESCRIBED FRACTIONAL DOSE: 266 CGRAY
RAD ONC MSQ PRESCRIBED FRACTIONAL DOSE: 266 CGRAY
RAD ONC MSQ PRESCRIBED NUMBER OF FRACTIONS: 16
RAD ONC MSQ PRESCRIBED NUMBER OF FRACTIONS: 16
RAD ONC MSQ PRESCRIBED TECHNIQUE: NORMAL
RAD ONC MSQ PRESCRIBED TECHNIQUE: NORMAL
RAD ONC MSQ PRESCRIBED TOTAL DOSE: 4256 CGRAY
RAD ONC MSQ PRESCRIBED TOTAL DOSE: 4256 CGRAY
RAD ONC MSQ START DATE: NORMAL
RAD ONC MSQ START DATE: NORMAL
RAD ONC MSQ TREATMENT COURSE NUMBER: 1
RAD ONC MSQ TREATMENT COURSE NUMBER: 1
RAD ONC MSQ TREATMENT SITE: NORMAL
RAD ONC MSQ TREATMENT SITE: NORMAL

## 2025-09-02 ENCOUNTER — HOSPITAL ENCOUNTER (OUTPATIENT)
Dept: RADIATION ONCOLOGY | Facility: CLINIC | Age: 60
Setting detail: RADIATION/ONCOLOGY SERIES
Discharge: HOME | End: 2025-09-02
Payer: COMMERCIAL

## 2025-09-02 DIAGNOSIS — C50.412 MALIGNANT NEOPLASM OF UPPER-OUTER QUADRANT OF LEFT FEMALE BREAST: ICD-10-CM

## 2025-09-02 DIAGNOSIS — C77.3 SECONDARY AND UNSPECIFIED MALIGNANT NEOPLASM OF AXILLA AND UPPER LIMB LYMPH NODES: ICD-10-CM

## 2025-09-02 DIAGNOSIS — Z51.0 ENCOUNTER FOR ANTINEOPLASTIC RADIATION THERAPY: ICD-10-CM

## 2025-09-02 LAB
RAD ONC MSQ ACTUAL FRACTIONS DELIVERED: 11
RAD ONC MSQ ACTUAL FRACTIONS DELIVERED: 11
RAD ONC MSQ ACTUAL SESSION DELIVERED DOSE: 266 CGRAY
RAD ONC MSQ ACTUAL SESSION DELIVERED DOSE: 266 CGRAY
RAD ONC MSQ ACTUAL TOTAL DOSE: 2926 CGRAY
RAD ONC MSQ ACTUAL TOTAL DOSE: 2926 CGRAY
RAD ONC MSQ ELAPSED DAYS: 15
RAD ONC MSQ ELAPSED DAYS: 15
RAD ONC MSQ LAST DATE: NORMAL
RAD ONC MSQ LAST DATE: NORMAL
RAD ONC MSQ PRESCRIBED FRACTIONAL DOSE: 266 CGRAY
RAD ONC MSQ PRESCRIBED FRACTIONAL DOSE: 266 CGRAY
RAD ONC MSQ PRESCRIBED NUMBER OF FRACTIONS: 16
RAD ONC MSQ PRESCRIBED NUMBER OF FRACTIONS: 16
RAD ONC MSQ PRESCRIBED TECHNIQUE: NORMAL
RAD ONC MSQ PRESCRIBED TECHNIQUE: NORMAL
RAD ONC MSQ PRESCRIBED TOTAL DOSE: 4256 CGRAY
RAD ONC MSQ PRESCRIBED TOTAL DOSE: 4256 CGRAY
RAD ONC MSQ START DATE: NORMAL
RAD ONC MSQ START DATE: NORMAL
RAD ONC MSQ TREATMENT COURSE NUMBER: 1
RAD ONC MSQ TREATMENT COURSE NUMBER: 1
RAD ONC MSQ TREATMENT SITE: NORMAL
RAD ONC MSQ TREATMENT SITE: NORMAL

## 2025-09-02 PROCEDURE — 77387 GUIDANCE FOR RADJ TX DLVR: CPT | Performed by: RADIOLOGY

## 2025-09-02 PROCEDURE — 77412 RADIATION TX DELIVERY LVL 3: CPT | Performed by: RADIOLOGY

## 2025-09-03 ENCOUNTER — HOSPITAL ENCOUNTER (OUTPATIENT)
Dept: RADIATION ONCOLOGY | Facility: CLINIC | Age: 60
Setting detail: RADIATION/ONCOLOGY SERIES
Discharge: HOME | End: 2025-09-03
Payer: COMMERCIAL

## 2025-09-03 VITALS — BODY MASS INDEX: 42.78 KG/M2 | WEIGHT: 242.95 LBS | TEMPERATURE: 96.8 F

## 2025-09-03 DIAGNOSIS — C50.412 MALIGNANT NEOPLASM OF UPPER-OUTER QUADRANT OF LEFT FEMALE BREAST: ICD-10-CM

## 2025-09-03 DIAGNOSIS — Z51.0 ENCOUNTER FOR ANTINEOPLASTIC RADIATION THERAPY: ICD-10-CM

## 2025-09-03 DIAGNOSIS — C77.3 SECONDARY AND UNSPECIFIED MALIGNANT NEOPLASM OF AXILLA AND UPPER LIMB LYMPH NODES: ICD-10-CM

## 2025-09-03 LAB
RAD ONC MSQ ACTUAL FRACTIONS DELIVERED: 12
RAD ONC MSQ ACTUAL FRACTIONS DELIVERED: 12
RAD ONC MSQ ACTUAL SESSION DELIVERED DOSE: 266 CGRAY
RAD ONC MSQ ACTUAL SESSION DELIVERED DOSE: 266 CGRAY
RAD ONC MSQ ACTUAL TOTAL DOSE: 3192 CGRAY
RAD ONC MSQ ACTUAL TOTAL DOSE: 3192 CGRAY
RAD ONC MSQ ELAPSED DAYS: 16
RAD ONC MSQ ELAPSED DAYS: 16
RAD ONC MSQ LAST DATE: NORMAL
RAD ONC MSQ LAST DATE: NORMAL
RAD ONC MSQ PRESCRIBED FRACTIONAL DOSE: 266 CGRAY
RAD ONC MSQ PRESCRIBED FRACTIONAL DOSE: 266 CGRAY
RAD ONC MSQ PRESCRIBED NUMBER OF FRACTIONS: 16
RAD ONC MSQ PRESCRIBED NUMBER OF FRACTIONS: 16
RAD ONC MSQ PRESCRIBED TECHNIQUE: NORMAL
RAD ONC MSQ PRESCRIBED TECHNIQUE: NORMAL
RAD ONC MSQ PRESCRIBED TOTAL DOSE: 4256 CGRAY
RAD ONC MSQ PRESCRIBED TOTAL DOSE: 4256 CGRAY
RAD ONC MSQ START DATE: NORMAL
RAD ONC MSQ START DATE: NORMAL
RAD ONC MSQ TREATMENT COURSE NUMBER: 1
RAD ONC MSQ TREATMENT COURSE NUMBER: 1
RAD ONC MSQ TREATMENT SITE: NORMAL
RAD ONC MSQ TREATMENT SITE: NORMAL

## 2025-09-03 PROCEDURE — 77412 RADIATION TX DELIVERY LVL 3: CPT | Performed by: RADIOLOGY

## 2025-09-03 PROCEDURE — 77387 GUIDANCE FOR RADJ TX DLVR: CPT | Performed by: RADIOLOGY

## 2025-09-03 ASSESSMENT — PAIN SCALES - GENERAL: PAINLEVEL_OUTOF10: 2

## 2025-09-04 ENCOUNTER — HOSPITAL ENCOUNTER (OUTPATIENT)
Dept: RADIATION ONCOLOGY | Facility: CLINIC | Age: 60
Setting detail: RADIATION/ONCOLOGY SERIES
Discharge: HOME | End: 2025-09-04
Payer: COMMERCIAL

## 2025-09-04 DIAGNOSIS — C50.412 MALIGNANT NEOPLASM OF UPPER-OUTER QUADRANT OF LEFT FEMALE BREAST: ICD-10-CM

## 2025-09-04 DIAGNOSIS — C77.3 SECONDARY AND UNSPECIFIED MALIGNANT NEOPLASM OF AXILLA AND UPPER LIMB LYMPH NODES: ICD-10-CM

## 2025-09-04 DIAGNOSIS — Z51.0 ENCOUNTER FOR ANTINEOPLASTIC RADIATION THERAPY: ICD-10-CM

## 2025-09-04 LAB
RAD ONC MSQ ACTUAL FRACTIONS DELIVERED: 13
RAD ONC MSQ ACTUAL FRACTIONS DELIVERED: 13
RAD ONC MSQ ACTUAL SESSION DELIVERED DOSE: 266 CGRAY
RAD ONC MSQ ACTUAL SESSION DELIVERED DOSE: 266 CGRAY
RAD ONC MSQ ACTUAL TOTAL DOSE: 3458 CGRAY
RAD ONC MSQ ACTUAL TOTAL DOSE: 3458 CGRAY
RAD ONC MSQ ELAPSED DAYS: 17
RAD ONC MSQ ELAPSED DAYS: 17
RAD ONC MSQ LAST DATE: NORMAL
RAD ONC MSQ LAST DATE: NORMAL
RAD ONC MSQ PRESCRIBED FRACTIONAL DOSE: 266 CGRAY
RAD ONC MSQ PRESCRIBED FRACTIONAL DOSE: 266 CGRAY
RAD ONC MSQ PRESCRIBED NUMBER OF FRACTIONS: 16
RAD ONC MSQ PRESCRIBED NUMBER OF FRACTIONS: 16
RAD ONC MSQ PRESCRIBED TECHNIQUE: NORMAL
RAD ONC MSQ PRESCRIBED TECHNIQUE: NORMAL
RAD ONC MSQ PRESCRIBED TOTAL DOSE: 4256 CGRAY
RAD ONC MSQ PRESCRIBED TOTAL DOSE: 4256 CGRAY
RAD ONC MSQ START DATE: NORMAL
RAD ONC MSQ START DATE: NORMAL
RAD ONC MSQ TREATMENT COURSE NUMBER: 1
RAD ONC MSQ TREATMENT COURSE NUMBER: 1
RAD ONC MSQ TREATMENT SITE: NORMAL
RAD ONC MSQ TREATMENT SITE: NORMAL

## 2025-09-04 PROCEDURE — 77336 RADIATION PHYSICS CONSULT: CPT | Performed by: RADIOLOGY

## 2025-09-04 PROCEDURE — 77387 GUIDANCE FOR RADJ TX DLVR: CPT | Performed by: RADIOLOGY

## 2025-09-04 PROCEDURE — 77412 RADIATION TX DELIVERY LVL 3: CPT | Performed by: RADIOLOGY

## 2025-09-05 ENCOUNTER — HOSPITAL ENCOUNTER (OUTPATIENT)
Dept: RADIATION ONCOLOGY | Facility: CLINIC | Age: 60
Setting detail: RADIATION/ONCOLOGY SERIES
Discharge: HOME | End: 2025-09-05
Payer: COMMERCIAL

## 2025-09-05 DIAGNOSIS — Z51.0 ENCOUNTER FOR ANTINEOPLASTIC RADIATION THERAPY: ICD-10-CM

## 2025-09-05 DIAGNOSIS — C77.3 SECONDARY AND UNSPECIFIED MALIGNANT NEOPLASM OF AXILLA AND UPPER LIMB LYMPH NODES: ICD-10-CM

## 2025-09-05 DIAGNOSIS — C50.412 MALIGNANT NEOPLASM OF UPPER-OUTER QUADRANT OF LEFT FEMALE BREAST: ICD-10-CM

## 2025-09-05 LAB
RAD ONC MSQ ACTUAL FRACTIONS DELIVERED: 14
RAD ONC MSQ ACTUAL FRACTIONS DELIVERED: 14
RAD ONC MSQ ACTUAL SESSION DELIVERED DOSE: 266 CGRAY
RAD ONC MSQ ACTUAL SESSION DELIVERED DOSE: 266 CGRAY
RAD ONC MSQ ACTUAL TOTAL DOSE: 3724 CGRAY
RAD ONC MSQ ACTUAL TOTAL DOSE: 3724 CGRAY
RAD ONC MSQ ELAPSED DAYS: 18
RAD ONC MSQ ELAPSED DAYS: 18
RAD ONC MSQ LAST DATE: NORMAL
RAD ONC MSQ LAST DATE: NORMAL
RAD ONC MSQ PRESCRIBED FRACTIONAL DOSE: 266 CGRAY
RAD ONC MSQ PRESCRIBED FRACTIONAL DOSE: 266 CGRAY
RAD ONC MSQ PRESCRIBED NUMBER OF FRACTIONS: 16
RAD ONC MSQ PRESCRIBED NUMBER OF FRACTIONS: 16
RAD ONC MSQ PRESCRIBED TECHNIQUE: NORMAL
RAD ONC MSQ PRESCRIBED TECHNIQUE: NORMAL
RAD ONC MSQ PRESCRIBED TOTAL DOSE: 4256 CGRAY
RAD ONC MSQ PRESCRIBED TOTAL DOSE: 4256 CGRAY
RAD ONC MSQ START DATE: NORMAL
RAD ONC MSQ START DATE: NORMAL
RAD ONC MSQ TREATMENT COURSE NUMBER: 1
RAD ONC MSQ TREATMENT COURSE NUMBER: 1
RAD ONC MSQ TREATMENT SITE: NORMAL
RAD ONC MSQ TREATMENT SITE: NORMAL

## 2025-11-03 ENCOUNTER — APPOINTMENT (OUTPATIENT)
Dept: OBSTETRICS AND GYNECOLOGY | Facility: CLINIC | Age: 60
End: 2025-11-03
Payer: COMMERCIAL

## (undated) DEVICE — STOCKINETTE, TUBE, BLN, ST, 1 PLY, 6 X 60 IN, LF

## (undated) DEVICE — COVER, TRANSDUCER, NEO GUARD, 4 X 30CM, LF

## (undated) DEVICE — BOWL, BASIN, 32 OZ, STERILE

## (undated) DEVICE — RADIOGRAPHY DEVICE, SPECIMEN, TRANSPEC

## (undated) DEVICE — APPLIER,  LIGACLIP MULTI CLIP, 30 MED 11 1/2

## (undated) DEVICE — GLOVE, SURGICAL, PROTEXIS PI BLUE W/NEUTHERA, 6.5, PF, LF

## (undated) DEVICE — SUTURE, MONOCRYL, 4-0, 27 IN, PS-2, UNDYED

## (undated) DEVICE — SYRINGE, 10 CC, LUER LOCK

## (undated) DEVICE — STRIP, SKIN CLOSURE, STERI STRIP, REINFORCED, 0.5 X 4 IN

## (undated) DEVICE — DRESSING, NON-ADHERENT, TELFA, OUCHLESS, 3 X 8 IN, STERILE

## (undated) DEVICE — SUTURE, VICRYL PLUS 3-0, SH, 27IN

## (undated) DEVICE — GLOVE, SURGICAL, PROTEXIS PI , 6.0, PF, LF

## (undated) DEVICE — CAUTERY, PENCIL, PUSH BUTTON, SMOKE EVAC, 70MM

## (undated) DEVICE — STRAP, ARM BOARD, 32 X 1.5

## (undated) DEVICE — ELECTRODE, ELECTROSURGICAL, BLADE, INSULATED, ENT/IMA, STERILE

## (undated) DEVICE — STRAP, VELCRO, BODY, 4 X 60IN, NS

## (undated) DEVICE — NEEDLE, SAFETY, 25 GA X 1.5 IN

## (undated) DEVICE — STRIP, SKIN CLOSURE, COMPOUND BENZION TINCTURE 0.6ML

## (undated) DEVICE — RETRACTOR, HANDHELD, 250ML, DBL ENDED

## (undated) DEVICE — DRAPE, SHEET, THREE QUARTER, FAN FOLD, 57 X 77 IN

## (undated) DEVICE — FORCEP, GRASPING, 155ML, TRIANGLE TIP, DISP

## (undated) DEVICE — Device

## (undated) DEVICE — APPLICATOR, CHLORAPREP, W/ORANGE TINT, 26ML

## (undated) DEVICE — DRAPE, SHEET, LAPAROTOMY, W/ISO-BAC, W/ARMBOARD COVERS, 98 X 122 IN, DISPOSABLE, LF, STERILE

## (undated) DEVICE — KIT, MARGINMARKER, 6 INK COLORS, STANDARD

## (undated) DEVICE — DRAPE, SHEET, XL

## (undated) DEVICE — SUTURE, SILK, 2-0, 18 IN, BLACK

## (undated) DEVICE — TOWEL PACK, STERILE, 16X24, XRAY DETECTABLE, BLUE, 4/PK

## (undated) DEVICE — MANIFOLD, 4 PORT NEPTUNE STANDARD